# Patient Record
Sex: FEMALE | Race: WHITE | NOT HISPANIC OR LATINO | Employment: UNEMPLOYED | URBAN - METROPOLITAN AREA
[De-identification: names, ages, dates, MRNs, and addresses within clinical notes are randomized per-mention and may not be internally consistent; named-entity substitution may affect disease eponyms.]

---

## 2019-03-19 ENCOUNTER — HOSPITAL ENCOUNTER (OUTPATIENT)
Dept: URGENT CARE | Facility: CLINIC | Age: 24
Discharge: HOME | End: 2019-03-19

## 2019-03-21 LAB — BACTERIA UR CULT: NORMAL

## 2020-03-07 ENCOUNTER — HOSPITAL ENCOUNTER (OUTPATIENT)
Dept: URGENT CARE | Facility: CLINIC | Age: 25
Discharge: HOME | End: 2020-03-07

## 2020-03-08 LAB
C TRACH RRNA CVX QL NAA+PROBE: NOT DETECTED
N GONORRHOEA DNA SPEC QL NAA+PROBE: NOT DETECTED

## 2020-03-12 ENCOUNTER — HOSPITAL ENCOUNTER (OUTPATIENT)
Dept: URGENT CARE | Facility: CLINIC | Age: 25
Discharge: HOME | End: 2020-03-12
Attending: FAMILY MEDICINE

## 2020-03-14 LAB — BACTERIA SPEC AEROBE CULT: NORMAL

## 2020-06-29 ENCOUNTER — HOSPITAL ENCOUNTER (OUTPATIENT)
Dept: URGENT CARE | Facility: CLINIC | Age: 25
Discharge: HOME | End: 2020-06-29
Attending: PHYSICIAN ASSISTANT

## 2021-01-14 ENCOUNTER — HOSPITAL ENCOUNTER (OUTPATIENT)
Dept: URGENT CARE | Facility: CLINIC | Age: 26
Discharge: HOME | End: 2021-01-14
Attending: FAMILY MEDICINE

## 2021-01-16 LAB — SARS-COV-2 RNA SPEC QL NAA+PROBE: NOT DETECTED

## 2022-05-18 ENCOUNTER — HOSPITAL ENCOUNTER (EMERGENCY)
Facility: HOSPITAL | Age: 27
Discharge: HOME | End: 2022-05-19
Attending: EMERGENCY MEDICINE | Admitting: EMERGENCY MEDICINE

## 2022-05-18 DIAGNOSIS — F10.920 ALCOHOLIC INTOXICATION WITHOUT COMPLICATION: ICD-10-CM

## 2022-05-18 DIAGNOSIS — F32.A DEPRESSION, UNSPECIFIED DEPRESSION TYPE: Primary | ICD-10-CM

## 2022-05-18 LAB
ALBUMIN SERPL-MCNC: 5.3 G/DL (ref 3.5–5.2)
ALBUMIN/GLOB SERPL: 2.2 G/DL
ALP SERPL-CCNC: 99 U/L (ref 39–117)
ALT SERPL W P-5'-P-CCNC: 17 U/L (ref 1–33)
AMPHET+METHAMPHET UR QL: NEGATIVE
ANION GAP SERPL CALCULATED.3IONS-SCNC: 15.5 MMOL/L (ref 5–15)
APAP SERPL-MCNC: <5 MCG/ML (ref 0–30)
AST SERPL-CCNC: 19 U/L (ref 1–32)
BARBITURATES UR QL SCN: NEGATIVE
BASOPHILS # BLD AUTO: 0.06 10*3/MM3 (ref 0–0.2)
BASOPHILS NFR BLD AUTO: 0.9 % (ref 0–1.5)
BENZODIAZ UR QL SCN: NEGATIVE
BILIRUB SERPL-MCNC: 0.2 MG/DL (ref 0–1.2)
BUN SERPL-MCNC: 9 MG/DL (ref 6–20)
BUN/CREAT SERPL: 8.7 (ref 7–25)
CALCIUM SPEC-SCNC: 8.9 MG/DL (ref 8.6–10.5)
CANNABINOIDS SERPL QL: NEGATIVE
CHLORIDE SERPL-SCNC: 112 MMOL/L (ref 98–107)
CO2 SERPL-SCNC: 21.5 MMOL/L (ref 22–29)
COCAINE UR QL: NEGATIVE
CREAT SERPL-MCNC: 1.04 MG/DL (ref 0.57–1)
DEPRECATED RDW RBC AUTO: 42.8 FL (ref 37–54)
EGFRCR SERPLBLD CKD-EPI 2021: 76.2 ML/MIN/1.73
EOSINOPHIL # BLD AUTO: 0.18 10*3/MM3 (ref 0–0.4)
EOSINOPHIL NFR BLD AUTO: 2.6 % (ref 0.3–6.2)
ERYTHROCYTE [DISTWIDTH] IN BLOOD BY AUTOMATED COUNT: 13.1 % (ref 12.3–15.4)
ETHANOL BLD-MCNC: 297 MG/DL (ref 0–10)
ETHANOL UR QL: 0.3 %
GLOBULIN UR ELPH-MCNC: 2.4 GM/DL
GLUCOSE SERPL-MCNC: 112 MG/DL (ref 65–99)
HCG INTACT+B SERPL-ACNC: <0.5 MIU/ML
HCT VFR BLD AUTO: 40.4 % (ref 34–46.6)
HGB BLD-MCNC: 13.6 G/DL (ref 12–15.9)
HOLD SPECIMEN: NORMAL
HOLD SPECIMEN: NORMAL
IMM GRANULOCYTES # BLD AUTO: 0.01 10*3/MM3 (ref 0–0.05)
IMM GRANULOCYTES NFR BLD AUTO: 0.1 % (ref 0–0.5)
LYMPHOCYTES # BLD AUTO: 3.32 10*3/MM3 (ref 0.7–3.1)
LYMPHOCYTES NFR BLD AUTO: 48.8 % (ref 19.6–45.3)
MCH RBC QN AUTO: 30.3 PG (ref 26.6–33)
MCHC RBC AUTO-ENTMCNC: 33.7 G/DL (ref 31.5–35.7)
MCV RBC AUTO: 90 FL (ref 79–97)
METHADONE UR QL SCN: NEGATIVE
MONOCYTES # BLD AUTO: 0.42 10*3/MM3 (ref 0.1–0.9)
MONOCYTES NFR BLD AUTO: 6.2 % (ref 5–12)
NEUTROPHILS NFR BLD AUTO: 2.81 10*3/MM3 (ref 1.7–7)
NEUTROPHILS NFR BLD AUTO: 41.4 % (ref 42.7–76)
NRBC BLD AUTO-RTO: 0 /100 WBC (ref 0–0.2)
OPIATES UR QL: NEGATIVE
OXYCODONE UR QL SCN: NEGATIVE
PLATELET # BLD AUTO: 371 10*3/MM3 (ref 140–450)
PMV BLD AUTO: 8.2 FL (ref 6–12)
POTASSIUM SERPL-SCNC: 4.1 MMOL/L (ref 3.5–5.2)
PROT SERPL-MCNC: 7.7 G/DL (ref 6–8.5)
RBC # BLD AUTO: 4.49 10*6/MM3 (ref 3.77–5.28)
SALICYLATES SERPL-MCNC: <0.3 MG/DL
SARS-COV-2 RNA RESP QL NAA+PROBE: NOT DETECTED
SODIUM SERPL-SCNC: 149 MMOL/L (ref 136–145)
WBC NRBC COR # BLD: 6.8 10*3/MM3 (ref 3.4–10.8)
WHOLE BLOOD HOLD COAG: NORMAL
WHOLE BLOOD HOLD SPECIMEN: NORMAL

## 2022-05-18 PROCEDURE — 80307 DRUG TEST PRSMV CHEM ANLYZR: CPT | Performed by: NURSE PRACTITIONER

## 2022-05-18 PROCEDURE — 80053 COMPREHEN METABOLIC PANEL: CPT | Performed by: NURSE PRACTITIONER

## 2022-05-18 PROCEDURE — 99285 EMERGENCY DEPT VISIT HI MDM: CPT

## 2022-05-18 PROCEDURE — 99284 EMERGENCY DEPT VISIT MOD MDM: CPT

## 2022-05-18 PROCEDURE — 85025 COMPLETE CBC W/AUTO DIFF WBC: CPT | Performed by: NURSE PRACTITIONER

## 2022-05-18 PROCEDURE — U0003 INFECTIOUS AGENT DETECTION BY NUCLEIC ACID (DNA OR RNA); SEVERE ACUTE RESPIRATORY SYNDROME CORONAVIRUS 2 (SARS-COV-2) (CORONAVIRUS DISEASE [COVID-19]), AMPLIFIED PROBE TECHNIQUE, MAKING USE OF HIGH THROUGHPUT TECHNOLOGIES AS DESCRIBED BY CMS-2020-01-R: HCPCS | Performed by: EMERGENCY MEDICINE

## 2022-05-18 PROCEDURE — 80143 DRUG ASSAY ACETAMINOPHEN: CPT | Performed by: NURSE PRACTITIONER

## 2022-05-18 PROCEDURE — 84702 CHORIONIC GONADOTROPIN TEST: CPT | Performed by: NURSE PRACTITIONER

## 2022-05-18 PROCEDURE — 82077 ASSAY SPEC XCP UR&BREATH IA: CPT | Performed by: NURSE PRACTITIONER

## 2022-05-18 PROCEDURE — 80179 DRUG ASSAY SALICYLATE: CPT | Performed by: NURSE PRACTITIONER

## 2022-05-18 PROCEDURE — 96375 TX/PRO/DX INJ NEW DRUG ADDON: CPT

## 2022-05-18 PROCEDURE — 25010000002 LORAZEPAM PER 2 MG: Performed by: EMERGENCY MEDICINE

## 2022-05-18 RX ORDER — LORAZEPAM 2 MG/ML
1 INJECTION INTRAMUSCULAR ONCE
Status: COMPLETED | OUTPATIENT
Start: 2022-05-18 | End: 2022-05-18

## 2022-05-18 RX ORDER — OMEPRAZOLE 20 MG/1
20 CAPSULE, DELAYED RELEASE ORAL DAILY
COMMUNITY
End: 2022-08-28

## 2022-05-18 RX ORDER — DESVENLAFAXINE 100 MG/1
100 TABLET, EXTENDED RELEASE ORAL DAILY
COMMUNITY
Start: 2022-05-06 | End: 2022-08-28

## 2022-05-18 RX ORDER — SODIUM CHLORIDE 0.9 % (FLUSH) 0.9 %
10 SYRINGE (ML) INJECTION AS NEEDED
Status: DISCONTINUED | OUTPATIENT
Start: 2022-05-18 | End: 2022-05-19 | Stop reason: HOSPADM

## 2022-05-18 RX ORDER — HYDROXYZINE HYDROCHLORIDE 25 MG/1
25-50 TABLET, FILM COATED ORAL
COMMUNITY
Start: 2022-05-06 | End: 2022-06-05

## 2022-05-18 RX ADMIN — LORAZEPAM 1 MG: 2 INJECTION INTRAMUSCULAR; INTRAVENOUS at 22:46

## 2022-05-19 VITALS
BODY MASS INDEX: 19.72 KG/M2 | SYSTOLIC BLOOD PRESSURE: 90 MMHG | DIASTOLIC BLOOD PRESSURE: 61 MMHG | HEART RATE: 87 BPM | RESPIRATION RATE: 20 BRPM | WEIGHT: 115.52 LBS | HEIGHT: 64 IN | OXYGEN SATURATION: 96 % | TEMPERATURE: 98.3 F

## 2022-05-19 LAB
ETHANOL BLD-MCNC: 120 MG/DL (ref 0–10)
ETHANOL BLD-MCNC: 72 MG/DL (ref 0–10)
ETHANOL UR QL: 0.07 %
ETHANOL UR QL: 0.12 %

## 2022-05-19 PROCEDURE — 25010000002 DIPHENHYDRAMINE PER 50 MG: Performed by: NURSE PRACTITIONER

## 2022-05-19 PROCEDURE — 96375 TX/PRO/DX INJ NEW DRUG ADDON: CPT

## 2022-05-19 PROCEDURE — 96365 THER/PROPH/DIAG IV INF INIT: CPT

## 2022-05-19 PROCEDURE — 82077 ASSAY SPEC XCP UR&BREATH IA: CPT | Performed by: NURSE PRACTITIONER

## 2022-05-19 PROCEDURE — 25010000002 THIAMINE PER 100 MG: Performed by: NURSE PRACTITIONER

## 2022-05-19 PROCEDURE — 25010000002 HALOPERIDOL LACTATE PER 5 MG: Performed by: NURSE PRACTITIONER

## 2022-05-19 RX ORDER — DIPHENHYDRAMINE HYDROCHLORIDE 50 MG/ML
25 INJECTION INTRAMUSCULAR; INTRAVENOUS ONCE
Status: COMPLETED | OUTPATIENT
Start: 2022-05-19 | End: 2022-05-19

## 2022-05-19 RX ORDER — HALOPERIDOL 5 MG/ML
2 INJECTION INTRAMUSCULAR ONCE
Status: COMPLETED | OUTPATIENT
Start: 2022-05-19 | End: 2022-05-19

## 2022-05-19 RX ORDER — NICOTINE 21 MG/24HR
1 PATCH, TRANSDERMAL 24 HOURS TRANSDERMAL ONCE
Status: DISCONTINUED | OUTPATIENT
Start: 2022-05-19 | End: 2022-05-19 | Stop reason: HOSPADM

## 2022-05-19 RX ADMIN — HALOPERIDOL LACTATE 2 MG: 5 INJECTION, SOLUTION INTRAMUSCULAR at 00:14

## 2022-05-19 RX ADMIN — NICOTINE 1 PATCH: 21 PATCH, EXTENDED RELEASE TRANSDERMAL at 00:10

## 2022-05-19 RX ADMIN — DIPHENHYDRAMINE HYDROCHLORIDE 25 MG: 50 INJECTION INTRAMUSCULAR; INTRAVENOUS at 00:12

## 2022-05-19 RX ADMIN — THIAMINE HYDROCHLORIDE 1000 ML/HR: 100 INJECTION, SOLUTION INTRAMUSCULAR; INTRAVENOUS at 00:18

## 2022-05-19 NOTE — ED NOTES
"Pt to ED from home with reports of alcohol and drug abuse.  Pt states \"I am stressed, and I am fucked!\"  Pt tearful in triage, refusing to answer questions and is screaming at ED staff.    Pt's friend states pt has depression and anxiety and about a week ago pt had anxiety attack.      Friend states pt drinks when she drank a pint of fireball today.    "

## 2022-05-19 NOTE — DISCHARGE INSTRUCTIONS
Avoid drug and alcohol use.    Follow-up with your PCP or therapist of choice.    Stay with a responsible adult today until sober.    Return for new or worsening symptoms

## 2022-05-19 NOTE — ED PROVIDER NOTES
Time: 06:49 EDT  Arrived by: Private vehicle  Chief Complaint: Depression and suicidal ideation  History provided by: Patient and friend  History is limited by: Patient intoxication     History of Present Illness:  Patient is a 27 y.o. year old female that presents to the emergency department with depression and suicidal ideation today      History provided by:  Patient and friend  Psychiatric Evaluation  Location:  General  Quality:  NA  Severity:  Unable to specify  Onset quality:  Unable to specify  Timing:  Intermittent  Progression:  Unchanged  Chronicity:  Recurrent  Context:  Patient has a history of depression and anxiety.  Today was drinking fireball got upset and started making comments about dying and committing suicide  Relieved by:  Nothing  Worsened by:  Alcohol use  Ineffective treatments:  None tried  Associated symptoms: no abdominal pain, no chest pain, no congestion, no cough, no diarrhea, no ear pain, no fatigue, no fever, no headaches, no loss of consciousness, no myalgias, no nausea, no rash, no rhinorrhea, no shortness of breath, no sore throat, no vomiting and no wheezing          Similar Symptoms Previously: Unknown  Recently seen: PCP 2 weeks ago for anxiety and depression      Patient Care Team  Primary Care Provider: Javid Niño    Past Medical History:     Allergies   Allergen Reactions   • Latex Anaphylaxis     Past Medical History:   Diagnosis Date   • Anxiety    • Depression      Past Surgical History:   Procedure Laterality Date   •  SECTION  02/10/2015     Family History   Problem Relation Age of Onset   • Ovarian cancer Mother         30'S   • Skin cancer Other    • Hypertension Other        Home Medications:  Prior to Admission medications    Not on File        Social History:   PT  reports that she has quit smoking. She has a 1.50 pack-year smoking history. She does not have any smokeless tobacco history on file. No history on file for alcohol use and drug use.    Record  "Review:  I have reviewed the patient's records in [facility].     Review of Systems  Review of Systems   Constitutional: Negative for fatigue and fever.   HENT: Negative for congestion, ear pain, rhinorrhea and sore throat.    Respiratory: Negative for cough, shortness of breath and wheezing.    Cardiovascular: Negative for chest pain.   Gastrointestinal: Negative for abdominal pain, diarrhea, nausea and vomiting.   Genitourinary: Negative for flank pain.   Musculoskeletal: Negative for back pain and myalgias.   Skin: Negative for rash.   Neurological: Negative for loss of consciousness and headaches.   Hematological: Negative.    Psychiatric/Behavioral: Positive for dysphoric mood and suicidal ideas. The patient is nervous/anxious.         Physical Exam  BP 90/61 (BP Location: Right arm, Patient Position: Lying)   Pulse 87   Temp 98.3 °F (36.8 °C) (Oral)   Resp 20   Ht 162.6 cm (64\")   Wt 52.4 kg (115 lb 8.3 oz)   LMP  (LMP Unknown)   SpO2 96%   BMI 19.83 kg/m²     Physical Exam  Vitals and nursing note reviewed.   Constitutional:       General: She is not in acute distress.     Appearance: She is not ill-appearing or toxic-appearing.      Comments: Patient awake and alert but obviously intoxicated.  Patient tearful and crying during exam   HENT:      Head: Normocephalic and atraumatic.      Right Ear: Tympanic membrane, ear canal and external ear normal.      Left Ear: Tympanic membrane, ear canal and external ear normal.      Mouth/Throat:      Mouth: Mucous membranes are moist.   Eyes:      General: No scleral icterus.  Cardiovascular:      Rate and Rhythm: Regular rhythm. Tachycardia present.      Pulses: Normal pulses.      Heart sounds: Normal heart sounds.   Pulmonary:      Effort: Pulmonary effort is normal. No respiratory distress.      Breath sounds: Normal breath sounds.   Abdominal:      General: Bowel sounds are normal.      Palpations: Abdomen is soft.      Tenderness: There is no abdominal " "tenderness.   Musculoskeletal:         General: Normal range of motion.      Cervical back: Normal range of motion and neck supple.   Skin:     General: Skin is warm and dry.   Neurological:      Mental Status: She is alert and oriented to person, place, and time.   Psychiatric:      Comments: Patient crying.  Patient reports being suicidal but despite no plan states \"I am sure I can figure something out\"  When asked why patient is feeling this way she refuses to answer questions and states she does not want to talk about it          ED Course  BP 90/61 (BP Location: Right arm, Patient Position: Lying)   Pulse 87   Temp 98.3 °F (36.8 °C) (Oral)   Resp 20   Ht 162.6 cm (64\")   Wt 52.4 kg (115 lb 8.3 oz)   LMP  (LMP Unknown)   SpO2 96%   BMI 19.83 kg/m²   Results for orders placed or performed during the hospital encounter of 05/18/22   COVID-19,CEPHEID/ARA,COR/YUSUF/PAD/RICO IN-HOUSE(OR EMERGENT/ADD-ON),NP SWAB IN TRANSPORT MEDIA 3-4 HR TAT, RT-PCR - Swab, Nasopharynx    Specimen: Nasopharynx; Swab   Result Value Ref Range    COVID19 Not Detected Not Detected - Ref. Range   Comprehensive Metabolic Panel    Specimen: Blood   Result Value Ref Range    Glucose 112 (H) 65 - 99 mg/dL    BUN 9 6 - 20 mg/dL    Creatinine 1.04 (H) 0.57 - 1.00 mg/dL    Sodium 149 (H) 136 - 145 mmol/L    Potassium 4.1 3.5 - 5.2 mmol/L    Chloride 112 (H) 98 - 107 mmol/L    CO2 21.5 (L) 22.0 - 29.0 mmol/L    Calcium 8.9 8.6 - 10.5 mg/dL    Total Protein 7.7 6.0 - 8.5 g/dL    Albumin 5.30 (H) 3.50 - 5.20 g/dL    ALT (SGPT) 17 1 - 33 U/L    AST (SGOT) 19 1 - 32 U/L    Alkaline Phosphatase 99 39 - 117 U/L    Total Bilirubin 0.2 0.0 - 1.2 mg/dL    Globulin 2.4 gm/dL    A/G Ratio 2.2 g/dL    BUN/Creatinine Ratio 8.7 7.0 - 25.0    Anion Gap 15.5 (H) 5.0 - 15.0 mmol/L    eGFR 76.2 >60.0 mL/min/1.73   Acetaminophen Level    Specimen: Blood   Result Value Ref Range    Acetaminophen <5.0 0.0 - 30.0 mcg/mL   Ethanol    Specimen: Blood   Result Value " Ref Range    Ethanol 297 (H) 0 - 10 mg/dL    Ethanol % 0.297 %   Salicylate Level    Specimen: Blood   Result Value Ref Range    Salicylate <0.3 <=30.0 mg/dL   Urine Drug Screen - Urine, Clean Catch    Specimen: Urine, Clean Catch   Result Value Ref Range    Amphet/Methamphet, Screen Negative Negative    Barbiturates Screen, Urine Negative Negative    Benzodiazepine Screen, Urine Negative Negative    Cocaine Screen, Urine Negative Negative    Opiate Screen Negative Negative    THC, Screen, Urine Negative Negative    Methadone Screen, Urine Negative Negative    Oxycodone Screen, Urine Negative Negative   CBC Auto Differential    Specimen: Blood   Result Value Ref Range    WBC 6.80 3.40 - 10.80 10*3/mm3    RBC 4.49 3.77 - 5.28 10*6/mm3    Hemoglobin 13.6 12.0 - 15.9 g/dL    Hematocrit 40.4 34.0 - 46.6 %    MCV 90.0 79.0 - 97.0 fL    MCH 30.3 26.6 - 33.0 pg    MCHC 33.7 31.5 - 35.7 g/dL    RDW 13.1 12.3 - 15.4 %    RDW-SD 42.8 37.0 - 54.0 fl    MPV 8.2 6.0 - 12.0 fL    Platelets 371 140 - 450 10*3/mm3    Neutrophil % 41.4 (L) 42.7 - 76.0 %    Lymphocyte % 48.8 (H) 19.6 - 45.3 %    Monocyte % 6.2 5.0 - 12.0 %    Eosinophil % 2.6 0.3 - 6.2 %    Basophil % 0.9 0.0 - 1.5 %    Immature Grans % 0.1 0.0 - 0.5 %    Neutrophils, Absolute 2.81 1.70 - 7.00 10*3/mm3    Lymphocytes, Absolute 3.32 (H) 0.70 - 3.10 10*3/mm3    Monocytes, Absolute 0.42 0.10 - 0.90 10*3/mm3    Eosinophils, Absolute 0.18 0.00 - 0.40 10*3/mm3    Basophils, Absolute 0.06 0.00 - 0.20 10*3/mm3    Immature Grans, Absolute 0.01 0.00 - 0.05 10*3/mm3    nRBC 0.0 0.0 - 0.2 /100 WBC   hCG, Quantitative, Pregnancy    Specimen: Blood   Result Value Ref Range    HCG Quantitative <0.50 mIU/mL   Ethanol    Specimen: Blood   Result Value Ref Range    Ethanol 120 (H) 0 - 10 mg/dL    Ethanol % 0.120 %   Ethanol    Specimen: Blood   Result Value Ref Range    Ethanol 72 (H) 0 - 10 mg/dL    Ethanol % 0.072 %   Green Top (Gel)   Result Value Ref Range    Extra Tube Hold for  add-ons.    Lavender Top   Result Value Ref Range    Extra Tube hold for add-on    Gold Top - SST   Result Value Ref Range    Extra Tube Hold for add-ons.    Light Blue Top   Result Value Ref Range    Extra Tube Hold for add-ons.      Medications   sodium chloride 0.9 % flush 10 mL (has no administration in time range)   nicotine (NICODERM CQ) 21 MG/24HR patch 1 patch (1 patch Transdermal Medication Applied 5/19/22 0010)   LORazepam (ATIVAN) injection 1 mg (1 mg Intravenous Given 5/18/22 2246)   thiamine (B-1) 100 mg, folic acid 1 mg in sodium chloride 0.9 % 1,000 mL infusion (0 mL/hr Intravenous Stopped 5/19/22 0318)   haloperidol lactate (HALDOL) injection 2 mg (2 mg Intravenous Given 5/19/22 0014)   diphenhydrAMINE (BENADRYL) injection 25 mg (25 mg Intravenous Given 5/19/22 0012)     No results found.      Medical Decision Making:                     MDM  Number of Diagnoses or Management Options  Alcoholic intoxication without complication (HCC)  Depression, unspecified depression type  Diagnosis management comments: I have spoken with the patient. I have explained the patient´s condition, diagnoses and treatment plan based on the information available to me at this time. I have answered the patient's questions and addressed any concerns. The patient has a good  understanding of the patient´s diagnosis, condition, and treatment plan as can be expected at this point. The vital signs have been stable. The patient´s condition is stable and appropriate for discharge from the emergency department.      The patient will pursue further outpatient evaluation with the primary care physician or other designated or consulting physician as outlined in the discharge instructions. They are agreeable to this plan of care and follow-up instructions have been explained in detail. The patient has received these instructions in written format and have expressed an understanding of the discharge instructions. The patient is aware  that any significant change in condition or worsening of symptoms should prompt an immediate return to this or the closest emergency department or call to 911.         Amount and/or Complexity of Data Reviewed  Clinical lab tests: reviewed and ordered  Obtain history from someone other than the patient: yes (Friend at bedside)    Risk of Complications, Morbidity, and/or Mortality  Presenting problems: low  Diagnostic procedures: low  Management options: low    Patient Progress  Patient progress: stable       Final diagnoses:   Depression, unspecified depression type   Alcoholic intoxication without complication (HCC)        Disposition:  ED Disposition     ED Disposition   Discharge    Condition   Stable    Comment   --              Yaquelin Sims, APRN  05/19/22 0649     Lab Facility: [id number] Lab Facility: [id number]

## 2023-12-14 ENCOUNTER — HOSPITAL ENCOUNTER (INPATIENT)
Facility: HOSPITAL | Age: 28
LOS: 2 days | Discharge: PSYCHIATRIC OR SUBSTANCE USE FACILITY | DRG: 918 | End: 2023-12-16
Attending: EMERGENCY MEDICINE | Admitting: FAMILY MEDICINE
Payer: COMMERCIAL

## 2023-12-14 DIAGNOSIS — T45.0X2A INTENTIONAL DIPHENHYDRAMINE OVERDOSE, INITIAL ENCOUNTER: Primary | ICD-10-CM

## 2023-12-14 DIAGNOSIS — F10.920 ALCOHOLIC INTOXICATION WITHOUT COMPLICATION: ICD-10-CM

## 2023-12-14 PROBLEM — T50.902A INTENTIONAL OVERDOSE: Status: ACTIVE | Noted: 2023-12-14

## 2023-12-14 LAB
ALBUMIN SERPL-MCNC: 4.5 G/DL (ref 3.5–5.2)
ALBUMIN/GLOB SERPL: 1.7 G/DL
ALP SERPL-CCNC: 114 U/L (ref 39–117)
ALT SERPL W P-5'-P-CCNC: 18 U/L (ref 1–33)
AMPHET+METHAMPHET UR QL: NEGATIVE
ANION GAP SERPL CALCULATED.3IONS-SCNC: 14.2 MMOL/L (ref 5–15)
APAP SERPL-MCNC: <5 MCG/ML (ref 0–30)
AST SERPL-CCNC: 21 U/L (ref 1–32)
BARBITURATES UR QL SCN: NEGATIVE
BASOPHILS # BLD AUTO: 0.04 10*3/MM3 (ref 0–0.2)
BASOPHILS NFR BLD AUTO: 0.5 % (ref 0–1.5)
BENZODIAZ UR QL SCN: POSITIVE
BILIRUB SERPL-MCNC: 0.2 MG/DL (ref 0–1.2)
BUN SERPL-MCNC: 4 MG/DL (ref 6–20)
BUN/CREAT SERPL: 4.5 (ref 7–25)
CALCIUM SPEC-SCNC: 8.7 MG/DL (ref 8.6–10.5)
CANNABINOIDS SERPL QL: POSITIVE
CHLORIDE SERPL-SCNC: 107 MMOL/L (ref 98–107)
CK SERPL-CCNC: 59 U/L (ref 20–180)
CO2 SERPL-SCNC: 23.8 MMOL/L (ref 22–29)
COCAINE UR QL: NEGATIVE
CREAT SERPL-MCNC: 0.88 MG/DL (ref 0.57–1)
DEPRECATED RDW RBC AUTO: 42.5 FL (ref 37–54)
EGFRCR SERPLBLD CKD-EPI 2021: 92.5 ML/MIN/1.73
EOSINOPHIL # BLD AUTO: 0.07 10*3/MM3 (ref 0–0.4)
EOSINOPHIL NFR BLD AUTO: 0.9 % (ref 0.3–6.2)
ERYTHROCYTE [DISTWIDTH] IN BLOOD BY AUTOMATED COUNT: 12.9 % (ref 12.3–15.4)
ETHANOL BLD-MCNC: 141 MG/DL (ref 0–10)
ETHANOL UR QL: 0.14 %
FENTANYL UR-MCNC: NEGATIVE NG/ML
GLOBULIN UR ELPH-MCNC: 2.7 GM/DL
GLUCOSE SERPL-MCNC: 89 MG/DL (ref 65–99)
HCT VFR BLD AUTO: 42.1 % (ref 34–46.6)
HGB BLD-MCNC: 13.8 G/DL (ref 12–15.9)
HOLD SPECIMEN: NORMAL
HOLD SPECIMEN: NORMAL
IMM GRANULOCYTES # BLD AUTO: 0.02 10*3/MM3 (ref 0–0.05)
IMM GRANULOCYTES NFR BLD AUTO: 0.3 % (ref 0–0.5)
LYMPHOCYTES # BLD AUTO: 2.31 10*3/MM3 (ref 0.7–3.1)
LYMPHOCYTES NFR BLD AUTO: 30.6 % (ref 19.6–45.3)
MCH RBC QN AUTO: 29.8 PG (ref 26.6–33)
MCHC RBC AUTO-ENTMCNC: 32.8 G/DL (ref 31.5–35.7)
MCV RBC AUTO: 90.9 FL (ref 79–97)
METHADONE UR QL SCN: NEGATIVE
MONOCYTES # BLD AUTO: 0.29 10*3/MM3 (ref 0.1–0.9)
MONOCYTES NFR BLD AUTO: 3.8 % (ref 5–12)
NEUTROPHILS NFR BLD AUTO: 4.83 10*3/MM3 (ref 1.7–7)
NEUTROPHILS NFR BLD AUTO: 63.9 % (ref 42.7–76)
NRBC BLD AUTO-RTO: 0 /100 WBC (ref 0–0.2)
OPIATES UR QL: NEGATIVE
OXYCODONE UR QL SCN: NEGATIVE
PLATELET # BLD AUTO: 438 10*3/MM3 (ref 140–450)
PMV BLD AUTO: 8.6 FL (ref 6–12)
POTASSIUM SERPL-SCNC: 3.6 MMOL/L (ref 3.5–5.2)
PROT SERPL-MCNC: 7.2 G/DL (ref 6–8.5)
RBC # BLD AUTO: 4.63 10*6/MM3 (ref 3.77–5.28)
SALICYLATES SERPL-MCNC: <0.3 MG/DL
SALICYLATES SERPL-MCNC: <0.3 MG/DL
SODIUM SERPL-SCNC: 145 MMOL/L (ref 136–145)
WBC NRBC COR # BLD AUTO: 7.56 10*3/MM3 (ref 3.4–10.8)
WHOLE BLOOD HOLD COAG: NORMAL
WHOLE BLOOD HOLD SPECIMEN: NORMAL

## 2023-12-14 PROCEDURE — 99285 EMERGENCY DEPT VISIT HI MDM: CPT

## 2023-12-14 PROCEDURE — 80307 DRUG TEST PRSMV CHEM ANLYZR: CPT | Performed by: EMERGENCY MEDICINE

## 2023-12-14 PROCEDURE — 80179 DRUG ASSAY SALICYLATE: CPT

## 2023-12-14 PROCEDURE — 93010 ELECTROCARDIOGRAM REPORT: CPT | Performed by: INTERNAL MEDICINE

## 2023-12-14 PROCEDURE — 82550 ASSAY OF CK (CPK): CPT

## 2023-12-14 PROCEDURE — 93005 ELECTROCARDIOGRAM TRACING: CPT

## 2023-12-14 PROCEDURE — 25010000002 ONDANSETRON PER 1 MG: Performed by: EMERGENCY MEDICINE

## 2023-12-14 PROCEDURE — 80143 DRUG ASSAY ACETAMINOPHEN: CPT

## 2023-12-14 PROCEDURE — 85025 COMPLETE CBC W/AUTO DIFF WBC: CPT

## 2023-12-14 PROCEDURE — 93005 ELECTROCARDIOGRAM TRACING: CPT | Performed by: EMERGENCY MEDICINE

## 2023-12-14 PROCEDURE — 80053 COMPREHEN METABOLIC PANEL: CPT

## 2023-12-14 PROCEDURE — 36415 COLL VENOUS BLD VENIPUNCTURE: CPT

## 2023-12-14 PROCEDURE — 25810000003 SODIUM CHLORIDE 0.9 % SOLUTION: Performed by: EMERGENCY MEDICINE

## 2023-12-14 PROCEDURE — 82077 ASSAY SPEC XCP UR&BREATH IA: CPT

## 2023-12-14 PROCEDURE — 80179 DRUG ASSAY SALICYLATE: CPT | Performed by: FAMILY MEDICINE

## 2023-12-14 PROCEDURE — 25010000002 LORAZEPAM PER 2 MG: Performed by: EMERGENCY MEDICINE

## 2023-12-14 PROCEDURE — 99222 1ST HOSP IP/OBS MODERATE 55: CPT | Performed by: FAMILY MEDICINE

## 2023-12-14 RX ORDER — BISACODYL 5 MG/1
5 TABLET, DELAYED RELEASE ORAL DAILY PRN
Status: DISCONTINUED | OUTPATIENT
Start: 2023-12-14 | End: 2023-12-15

## 2023-12-14 RX ORDER — SODIUM CHLORIDE 9 MG/ML
40 INJECTION, SOLUTION INTRAVENOUS AS NEEDED
Status: DISCONTINUED | OUTPATIENT
Start: 2023-12-14 | End: 2023-12-16 | Stop reason: HOSPADM

## 2023-12-14 RX ORDER — LORAZEPAM 2 MG/ML
1 INJECTION INTRAMUSCULAR ONCE
Status: COMPLETED | OUTPATIENT
Start: 2023-12-14 | End: 2023-12-14

## 2023-12-14 RX ORDER — BISACODYL 10 MG
10 SUPPOSITORY, RECTAL RECTAL DAILY PRN
Status: DISCONTINUED | OUTPATIENT
Start: 2023-12-14 | End: 2023-12-15

## 2023-12-14 RX ORDER — SODIUM CHLORIDE 0.9 % (FLUSH) 0.9 %
10 SYRINGE (ML) INJECTION AS NEEDED
Status: DISCONTINUED | OUTPATIENT
Start: 2023-12-14 | End: 2023-12-16 | Stop reason: HOSPADM

## 2023-12-14 RX ORDER — ONDANSETRON 2 MG/ML
4 INJECTION INTRAMUSCULAR; INTRAVENOUS ONCE
Status: COMPLETED | OUTPATIENT
Start: 2023-12-14 | End: 2023-12-14

## 2023-12-14 RX ORDER — SODIUM CHLORIDE 0.9 % (FLUSH) 0.9 %
10 SYRINGE (ML) INJECTION EVERY 12 HOURS SCHEDULED
Status: DISCONTINUED | OUTPATIENT
Start: 2023-12-14 | End: 2023-12-16 | Stop reason: HOSPADM

## 2023-12-14 RX ORDER — HEPARIN SODIUM 5000 [USP'U]/ML
5000 INJECTION, SOLUTION INTRAVENOUS; SUBCUTANEOUS EVERY 8 HOURS SCHEDULED
Status: DISCONTINUED | OUTPATIENT
Start: 2023-12-14 | End: 2023-12-16 | Stop reason: HOSPADM

## 2023-12-14 RX ORDER — ALPRAZOLAM 1 MG/1
1 TABLET ORAL 2 TIMES DAILY PRN
Status: DISCONTINUED | OUTPATIENT
Start: 2023-12-14 | End: 2023-12-15

## 2023-12-14 RX ORDER — POLYETHYLENE GLYCOL 3350 17 G/17G
17 POWDER, FOR SOLUTION ORAL DAILY PRN
Status: DISCONTINUED | OUTPATIENT
Start: 2023-12-14 | End: 2023-12-15

## 2023-12-14 RX ORDER — FAMOTIDINE 10 MG/ML
20 INJECTION, SOLUTION INTRAVENOUS ONCE
Status: COMPLETED | OUTPATIENT
Start: 2023-12-14 | End: 2023-12-14

## 2023-12-14 RX ORDER — AMOXICILLIN 250 MG
2 CAPSULE ORAL 2 TIMES DAILY
Status: DISCONTINUED | OUTPATIENT
Start: 2023-12-14 | End: 2023-12-15

## 2023-12-14 RX ORDER — NITROGLYCERIN 0.4 MG/1
0.4 TABLET SUBLINGUAL
Status: DISCONTINUED | OUTPATIENT
Start: 2023-12-14 | End: 2023-12-15

## 2023-12-14 RX ADMIN — ONDANSETRON 4 MG: 2 INJECTION INTRAMUSCULAR; INTRAVENOUS at 20:11

## 2023-12-14 RX ADMIN — FAMOTIDINE 20 MG: 10 INJECTION INTRAVENOUS at 20:13

## 2023-12-14 RX ADMIN — SODIUM CHLORIDE 1000 ML: 9 INJECTION, SOLUTION INTRAVENOUS at 19:00

## 2023-12-14 RX ADMIN — LORAZEPAM 1 MG: 2 INJECTION INTRAMUSCULAR; INTRAVENOUS at 19:00

## 2023-12-14 NOTE — ED PROVIDER NOTES
Time: 6:16 PM EST  Date of encounter:  2023  Independent Historian/Clinical History and Information was obtained by:   Patient and Nursing Staff    History is limited by: N/A    Chief Complaint: Intentional overdose      History of Present Illness:  Patient is a 28 y.o. year old female who presents to the emergency department for evaluation of intentional overdose/suicide attempt.  Patient states she took 30-40 diphenhydramine tablets in the past 4 to 5 hours.  This was an attempt to kill herself.  She also has had alcohol today.  She denies any other coingestants.    HPI    Patient Care Team  Primary Care Provider: Javid Niño NP    Past Medical History:     Allergies   Allergen Reactions    Latex Anaphylaxis     Past Medical History:   Diagnosis Date    Anxiety     Depression      Past Surgical History:   Procedure Laterality Date     SECTION  02/10/2015     Family History   Problem Relation Age of Onset    Hypertension Mother     Ovarian cancer Mother         30'S    Hypertension Father     Skin cancer Other     Hypertension Other        Home Medications:  Prior to Admission medications    Medication Sig Start Date End Date Taking? Authorizing Provider   ALPRAZolam (XANAX) 1 MG tablet Take 1 tablet by mouth.    ProviderMagdalena MD   ARIPiprazole (Abilify) 15 MG tablet Take 15 mg by mouth Daily.    Emergency, Nurse Angelica RN   benztropine (COGENTIN) 1 MG tablet Take 1 tablet by mouth.    ProviderMagdalena MD   desvenlafaxine (PRISTIQ) 50 MG 24 hr tablet Take 1 tablet by mouth Every Morning. 10/27/23   ProviderMagdalena MD   doxepin (SINEquan) 50 MG capsule Take 1 capsule by mouth.    ProviderMagdalena MD   gabapentin (NEURONTIN) 600 MG tablet Take 600 mg by mouth 3 (Three) Times a Day.    Emergency, Nurse Angelica RN   lamoTRIgine (LaMICtal) 200 MG tablet Take 1 tablet by mouth Daily.    Magdalena Can MD   Levonorgestrel (Mirena, 52 MG,) 20 MCG/DAY intrauterine device IUD 1  "each by Intrauterine route.    Emergency, Nurse Angelica, RN        Social History:   Social History     Tobacco Use    Smoking status: Former     Packs/day: 0.50     Years: 3.00     Additional pack years: 0.00     Total pack years: 1.50     Types: Cigarettes    Smokeless tobacco: Never   Vaping Use    Vaping Use: Never used   Substance Use Topics    Alcohol use: Yes     Comment: occasional    Drug use: Defer         Review of Systems:  Review of Systems   Constitutional:  Negative for chills and fever.   HENT:  Negative for congestion, rhinorrhea and sore throat.    Eyes:  Negative for photophobia.   Respiratory:  Negative for apnea, cough, chest tightness and shortness of breath.    Cardiovascular:  Negative for chest pain and palpitations.   Gastrointestinal:  Positive for nausea. Negative for abdominal pain, diarrhea and vomiting.   Endocrine: Negative.    Genitourinary:  Negative for difficulty urinating and dysuria.   Musculoskeletal:  Negative for back pain, joint swelling and myalgias.   Skin:  Negative for color change and wound.   Allergic/Immunologic: Negative.    Neurological:  Positive for tremors. Negative for seizures and headaches.   Psychiatric/Behavioral: Negative.  The patient is nervous/anxious.    All other systems reviewed and are negative.       Physical Exam:  /93   Pulse 98   Temp 97.7 °F (36.5 °C) (Oral)   Resp 18   Ht 162.6 cm (64\")   Wt 76.8 kg (169 lb 5 oz)   LMP  (LMP Unknown) Comment: does not have periods with IUD, hasn't had one in 5 years  SpO2 99%   BMI 29.06 kg/m²     Physical Exam  Vitals and nursing note reviewed.   Constitutional:       General: She is awake.      Appearance: Normal appearance.   HENT:      Head: Normocephalic and atraumatic.      Nose: Nose normal.      Mouth/Throat:      Mouth: Mucous membranes are moist.   Eyes:      Extraocular Movements: Extraocular movements intact.      Pupils: Pupils are equal, round, and reactive to light.      Comments: " Dilated pupils   Cardiovascular:      Rate and Rhythm: Normal rate and regular rhythm.      Heart sounds: Normal heart sounds.   Pulmonary:      Effort: Pulmonary effort is normal. No respiratory distress.      Breath sounds: Normal breath sounds. No wheezing, rhonchi or rales.   Abdominal:      General: Bowel sounds are normal.      Palpations: Abdomen is soft.      Tenderness: There is no abdominal tenderness. There is no guarding or rebound.      Comments: No rigidity   Musculoskeletal:         General: No tenderness. Normal range of motion.      Cervical back: Normal range of motion and neck supple.   Skin:     General: Skin is warm and dry.      Coloration: Skin is not jaundiced.   Neurological:      General: No focal deficit present.      Mental Status: She is alert.      Comments: Fully alert   Psychiatric:      Comments: Tremulous, mildly confused, anxious                  Procedures:  Procedures      Medical Decision Making:      Comorbidities that affect care:    Depression/anxiety    External Notes reviewed:    Previous Clinic Note: Family medicine outpatient visit 10/24/2023 for tinea corporis      The following orders were placed and all results were independently analyzed by me:  Orders Placed This Encounter   Procedures    [place] Draw    Comprehensive Metabolic Panel    Acetaminophen Level    Ethanol    Salicylate Level    Urine Drug Screen - Urine, Clean Catch    CBC Auto Differential    CK    NPO Diet NPO Type: Strict NPO    Continuous Pulse Oximetry    Vital Signs    Undress & Gown    Psych / Access to See    Hospitalist (on-call MD unless specified)    Oxygen Therapy- Nasal Cannula; Titrate 1-6 LPM Per SpO2; 90 - 95%    POC Glucose Once    ECG 12 Lead Drug Monitoring    Insert Peripheral IV    Legal Status 72 Hour Hold    Suicide Precautions    CBC & Differential    Green Top (Gel)    Lavender Top    Gold Top - SST    Light Blue Top       Medications Given in the Emergency  Department:  Medications   sodium chloride 0.9 % flush 10 mL (has no administration in time range)   famotidine (PEPCID) injection 20 mg (has no administration in time range)   ondansetron (ZOFRAN) injection 4 mg (has no administration in time range)   LORazepam (ATIVAN) injection 1 mg (1 mg Intravenous Given 12/14/23 1900)   sodium chloride 0.9 % bolus 1,000 mL (1,000 mL Intravenous New Bag 12/14/23 1900)        ED Course:    ED Course as of 12/14/23 2007   Thu Dec 14, 2023   1850 I have personally interpreted the EKG today and it shows no evidence of any acute ischemia or heart arrhythmia.  Sinus rhythm, heart rate 98 [RP]      ED Course User Index  [RP] Jak Harrell MD       Labs:    Lab Results (last 24 hours)       Procedure Component Value Units Date/Time    CBC & Differential [[id number]]  (Abnormal) Collected: 12/14/23 1645    Specimen: Blood Updated: 12/14/23 1652    Narrative:      The following orders were created for panel order CBC & Differential.  Procedure                               Abnormality         Status                     ---------                               -----------         ------                     CBC Auto Differential[[id number]]        Abnormal            Final result                 Please view results for these tests on the individual orders.    Comprehensive Metabolic Panel [[id number]]  (Abnormal) Collected: 12/14/23 1645    Specimen: Blood Updated: 12/14/23 1739     Glucose 89 mg/dL      BUN 4 mg/dL      Creatinine 0.88 mg/dL      Sodium 145 mmol/L      Potassium 3.6 mmol/L      Comment: Slight hemolysis detected by analyzer. Result may be falsely elevated.        Chloride 107 mmol/L      CO2 23.8 mmol/L      Calcium 8.7 mg/dL      Total Protein 7.2 g/dL      Albumin 4.5 g/dL      ALT (SGPT) 18 U/L      AST (SGOT) 21 U/L      Comment: Slight hemolysis detected by analyzer. Result may be falsely elevated.        Alkaline Phosphatase 114 U/L      Total Bilirubin 0.2 mg/dL       Globulin 2.7 gm/dL      A/G Ratio 1.7 g/dL      BUN/Creatinine Ratio 4.5     Anion Gap 14.2 mmol/L      eGFR 92.5 mL/min/1.73     Narrative:      GFR Normal >60  Chronic Kidney Disease <60  Kidney Failure <15      Acetaminophen Level [[id number]]  (Normal) Collected: 12/14/23 1645    Specimen: Blood Updated: 12/14/23 1717     Acetaminophen <5.0 mcg/mL     Ethanol [[id number]]  (Abnormal) Collected: 12/14/23 1645    Specimen: Blood Updated: 12/14/23 1717     Ethanol 141 mg/dL      Ethanol % 0.141 %     Narrative:      Ethanol (Plasma)  <10 Essentially Negative    Toxic Concentrations           mg/dL    Flushing, slowing of reflexes    Impaired visual activity         Depression of CNS              >100  Possible Coma                  >300       Salicylate Level [[id number]]  (Normal) Collected: 12/14/23 1645    Specimen: Blood Updated: 12/14/23 1717     Salicylate <0.3 mg/dL     CBC Auto Differential [[id number]]  (Abnormal) Collected: 12/14/23 1645    Specimen: Blood Updated: 12/14/23 1652     WBC 7.56 10*3/mm3      RBC 4.63 10*6/mm3      Hemoglobin 13.8 g/dL      Hematocrit 42.1 %      MCV 90.9 fL      MCH 29.8 pg      MCHC 32.8 g/dL      RDW 12.9 %      RDW-SD 42.5 fl      MPV 8.6 fL      Platelets 438 10*3/mm3      Neutrophil % 63.9 %      Lymphocyte % 30.6 %      Monocyte % 3.8 %      Eosinophil % 0.9 %      Basophil % 0.5 %      Immature Grans % 0.3 %      Neutrophils, Absolute 4.83 10*3/mm3      Lymphocytes, Absolute 2.31 10*3/mm3      Monocytes, Absolute 0.29 10*3/mm3      Eosinophils, Absolute 0.07 10*3/mm3      Basophils, Absolute 0.04 10*3/mm3      Immature Grans, Absolute 0.02 10*3/mm3      nRBC 0.0 /100 WBC     CK [[id number]]  (Normal) Collected: 12/14/23 1645    Specimen: Blood Updated: 12/14/23 1717     Creatine Kinase 59 U/L     Urine Drug Screen - Urine, Clean Catch [[id number]]  (Abnormal) Collected: 12/14/23 1900    Specimen: Urine, Clean Catch Updated: 12/14/23 1942      Amphet/Methamphet, Screen Negative     Barbiturates Screen, Urine Negative     Benzodiazepine Screen, Urine Positive     Cocaine Screen, Urine Negative     Opiate Screen Negative     THC, Screen, Urine Positive     Methadone Screen, Urine Negative     Oxycodone Screen, Urine Negative     Fentanyl, Urine Negative    Narrative:      Negative Thresholds Per Drugs Screened:    Amphetamines                 500 ng/ml  Barbiturates                 200 ng/ml  Benzodiazepines              100 ng/ml  Cocaine                      300 ng/ml  Methadone                    300 ng/ml  Opiates                      300 ng/ml  Oxycodone                    100 ng/ml  THC                           50 ng/ml  Fentanyl                       5 ng/ml      The Normal Value for all drugs tested is negative. This report includes final unconfirmed screening results to be used for medical treatment purposes only. Unconfirmed results must not be used for non-medical purposes such as employment or legal testing. Clinical consideration should be applied to any drug of abuse test, particularly when unconfirmed results are used.                     Imaging:    No Radiology Exams Resulted Within Past 24 Hours      Differential Diagnosis and Discussion:    Psychiatric: Differential diagnosis includes but is not limited to depression, psychosis, bipolar disorder, anxiety, manic episode, schizophrenia, and substance abuse.    All labs were reviewed and interpreted by me.  EKG was interpreted by me.    [facility]               Patient Care Considerations:    CT HEAD: I considered ordering a noncontrast CT of the head, however patient did not have any head injury.      Consultants/Shared Management Plan:    Hospitalist: I have discussed the case with Dr. Purcell who agrees to accept the patient for admission.    Social Determinants of Health:    Patient is independent, reliable, and has access to care.       Disposition and Care Coordination:    Admit:   Through  independent evaluation of the patient's history, physical, and imperical data, the patient meets criteria for observation/admission to the hospital.        Final diagnoses:   Intentional diphenhydramine overdose, initial encounter   Alcoholic intoxication without complication        ED Disposition       ED Disposition   Decision to Admit    Condition   --    Comment   --               This medical record created using voice recognition software.             Jak Harrell MD  12/14/23 2008

## 2023-12-14 NOTE — ED NOTES
RN contacted Poison Control per OD protocol. PC advised to add a CK to existing lab work, to do an EKG and to watch for a widening QRS, and to monitor kidney function and urine output. PC also advised to watch for possible delirium and seizures and noted to use benzos as first line tx should a seizure occur. PC requested current vitals and asked to be updated should pt's condition change.

## 2023-12-14 NOTE — LETTER
[facility]  [address]  [place] [address]  [phone]        December 15, 2023      Patient: Romy Alonso  YOB: 1995  Date of Visit: 12/14/2023    Referral-    Intentional OD    Thank you,  Melita Basilio, MSW  [phone]

## 2023-12-15 LAB
ANION GAP SERPL CALCULATED.3IONS-SCNC: 14.2 MMOL/L (ref 5–15)
APAP SERPL-MCNC: <5 MCG/ML (ref 0–30)
BASOPHILS # BLD AUTO: 0.06 10*3/MM3 (ref 0–0.2)
BASOPHILS NFR BLD AUTO: 0.3 % (ref 0–1.5)
BUN SERPL-MCNC: 6 MG/DL (ref 6–20)
BUN/CREAT SERPL: 7.3 (ref 7–25)
CALCIUM SPEC-SCNC: 9.2 MG/DL (ref 8.6–10.5)
CHLORIDE SERPL-SCNC: 103 MMOL/L (ref 98–107)
CO2 SERPL-SCNC: 21.8 MMOL/L (ref 22–29)
CREAT SERPL-MCNC: 0.82 MG/DL (ref 0.57–1)
DEPRECATED RDW RBC AUTO: 43.1 FL (ref 37–54)
EGFRCR SERPLBLD CKD-EPI 2021: 100.7 ML/MIN/1.73
EOSINOPHIL # BLD AUTO: 0.02 10*3/MM3 (ref 0–0.4)
EOSINOPHIL NFR BLD AUTO: 0.1 % (ref 0.3–6.2)
ERYTHROCYTE [DISTWIDTH] IN BLOOD BY AUTOMATED COUNT: 13 % (ref 12.3–15.4)
GLUCOSE BLDC GLUCOMTR-MCNC: 95 MG/DL (ref 70–99)
GLUCOSE SERPL-MCNC: 96 MG/DL (ref 65–99)
HCT VFR BLD AUTO: 39.5 % (ref 34–46.6)
HGB BLD-MCNC: 13 G/DL (ref 12–15.9)
IMM GRANULOCYTES # BLD AUTO: 0.1 10*3/MM3 (ref 0–0.05)
IMM GRANULOCYTES NFR BLD AUTO: 0.5 % (ref 0–0.5)
LYMPHOCYTES # BLD AUTO: 2.63 10*3/MM3 (ref 0.7–3.1)
LYMPHOCYTES NFR BLD AUTO: 12.9 % (ref 19.6–45.3)
MAGNESIUM SERPL-MCNC: 1.6 MG/DL (ref 1.6–2.6)
MCH RBC QN AUTO: 29.9 PG (ref 26.6–33)
MCHC RBC AUTO-ENTMCNC: 32.9 G/DL (ref 31.5–35.7)
MCV RBC AUTO: 90.8 FL (ref 79–97)
MONOCYTES # BLD AUTO: 0.95 10*3/MM3 (ref 0.1–0.9)
MONOCYTES NFR BLD AUTO: 4.7 % (ref 5–12)
NEUTROPHILS NFR BLD AUTO: 16.6 10*3/MM3 (ref 1.7–7)
NEUTROPHILS NFR BLD AUTO: 81.5 % (ref 42.7–76)
NRBC BLD AUTO-RTO: 0 /100 WBC (ref 0–0.2)
PLATELET # BLD AUTO: 402 10*3/MM3 (ref 140–450)
PMV BLD AUTO: 8.7 FL (ref 6–12)
POTASSIUM SERPL-SCNC: 3.6 MMOL/L (ref 3.5–5.2)
PROCALCITONIN SERPL-MCNC: 0.11 NG/ML (ref 0–0.25)
RBC # BLD AUTO: 4.35 10*6/MM3 (ref 3.77–5.28)
SODIUM SERPL-SCNC: 139 MMOL/L (ref 136–145)
WBC NRBC COR # BLD AUTO: 20.36 10*3/MM3 (ref 3.4–10.8)

## 2023-12-15 PROCEDURE — 25010000002 THIAMINE HCL 200 MG/2ML SOLUTION: Performed by: STUDENT IN AN ORGANIZED HEALTH CARE EDUCATION/TRAINING PROGRAM

## 2023-12-15 PROCEDURE — 25810000003 SODIUM CHLORIDE 0.9 % SOLUTION: Performed by: INTERNAL MEDICINE

## 2023-12-15 PROCEDURE — 94799 UNLISTED PULMONARY SVC/PX: CPT

## 2023-12-15 PROCEDURE — 82948 REAGENT STRIP/BLOOD GLUCOSE: CPT

## 2023-12-15 PROCEDURE — 93010 ELECTROCARDIOGRAM REPORT: CPT | Performed by: INTERNAL MEDICINE

## 2023-12-15 PROCEDURE — 94761 N-INVAS EAR/PLS OXIMETRY MLT: CPT

## 2023-12-15 PROCEDURE — 80048 BASIC METABOLIC PNL TOTAL CA: CPT | Performed by: FAMILY MEDICINE

## 2023-12-15 PROCEDURE — 83735 ASSAY OF MAGNESIUM: CPT | Performed by: INTERNAL MEDICINE

## 2023-12-15 PROCEDURE — 84145 PROCALCITONIN (PCT): CPT | Performed by: INTERNAL MEDICINE

## 2023-12-15 PROCEDURE — 80143 DRUG ASSAY ACETAMINOPHEN: CPT | Performed by: FAMILY MEDICINE

## 2023-12-15 PROCEDURE — 25010000002 HEPARIN (PORCINE) PER 1000 UNITS: Performed by: FAMILY MEDICINE

## 2023-12-15 PROCEDURE — 25010000002 LORAZEPAM PER 2 MG: Performed by: STUDENT IN AN ORGANIZED HEALTH CARE EDUCATION/TRAINING PROGRAM

## 2023-12-15 PROCEDURE — 25010000002 MAGNESIUM SULFATE 4 GM/100ML SOLUTION: Performed by: INTERNAL MEDICINE

## 2023-12-15 PROCEDURE — 63710000001 ONDANSETRON PER 8 MG: Performed by: INTERNAL MEDICINE

## 2023-12-15 PROCEDURE — 93005 ELECTROCARDIOGRAM TRACING: CPT | Performed by: INTERNAL MEDICINE

## 2023-12-15 PROCEDURE — 85025 COMPLETE CBC W/AUTO DIFF WBC: CPT | Performed by: FAMILY MEDICINE

## 2023-12-15 PROCEDURE — 99233 SBSQ HOSP IP/OBS HIGH 50: CPT | Performed by: INTERNAL MEDICINE

## 2023-12-15 RX ORDER — LORAZEPAM 2 MG/ML
1 INJECTION INTRAMUSCULAR
Status: DISCONTINUED | OUTPATIENT
Start: 2023-12-15 | End: 2023-12-15

## 2023-12-15 RX ORDER — ALPRAZOLAM 1 MG/1
1 TABLET ORAL 3 TIMES DAILY PRN
Status: DISCONTINUED | OUTPATIENT
Start: 2023-12-15 | End: 2023-12-16 | Stop reason: HOSPADM

## 2023-12-15 RX ORDER — AMOXICILLIN 250 MG
1 CAPSULE ORAL NIGHTLY PRN
Status: DISCONTINUED | OUTPATIENT
Start: 2023-12-15 | End: 2023-12-16 | Stop reason: HOSPADM

## 2023-12-15 RX ORDER — LORAZEPAM 2 MG/ML
2 INJECTION INTRAMUSCULAR
Status: DISCONTINUED | OUTPATIENT
Start: 2023-12-15 | End: 2023-12-15

## 2023-12-15 RX ORDER — BISACODYL 10 MG
10 SUPPOSITORY, RECTAL RECTAL DAILY PRN
Status: DISCONTINUED | OUTPATIENT
Start: 2023-12-15 | End: 2023-12-16 | Stop reason: HOSPADM

## 2023-12-15 RX ORDER — MAGNESIUM SULFATE HEPTAHYDRATE 40 MG/ML
4 INJECTION, SOLUTION INTRAVENOUS ONCE
Status: COMPLETED | OUTPATIENT
Start: 2023-12-15 | End: 2023-12-16

## 2023-12-15 RX ORDER — LORAZEPAM 2 MG/ML
0.5 INJECTION INTRAMUSCULAR ONCE
Status: COMPLETED | OUTPATIENT
Start: 2023-12-16 | End: 2023-12-15

## 2023-12-15 RX ORDER — ACETAMINOPHEN 325 MG/1
650 TABLET ORAL EVERY 6 HOURS PRN
Status: DISCONTINUED | OUTPATIENT
Start: 2023-12-15 | End: 2023-12-16 | Stop reason: HOSPADM

## 2023-12-15 RX ORDER — POLYETHYLENE GLYCOL 3350 17 G/17G
17 POWDER, FOR SOLUTION ORAL DAILY PRN
Status: DISCONTINUED | OUTPATIENT
Start: 2023-12-15 | End: 2023-12-16 | Stop reason: HOSPADM

## 2023-12-15 RX ORDER — LORAZEPAM 2 MG/1
2 TABLET ORAL EVERY 6 HOURS
Status: DISCONTINUED | OUTPATIENT
Start: 2023-12-15 | End: 2023-12-15

## 2023-12-15 RX ORDER — ONDANSETRON 4 MG/1
4 TABLET, FILM COATED ORAL EVERY 6 HOURS PRN
Status: DISCONTINUED | OUTPATIENT
Start: 2023-12-15 | End: 2023-12-16 | Stop reason: HOSPADM

## 2023-12-15 RX ORDER — LORAZEPAM 0.5 MG/1
1 TABLET ORAL EVERY 6 HOURS
Status: DISCONTINUED | OUTPATIENT
Start: 2023-12-16 | End: 2023-12-15

## 2023-12-15 RX ORDER — LORAZEPAM 2 MG/1
2 TABLET ORAL
Status: DISCONTINUED | OUTPATIENT
Start: 2023-12-15 | End: 2023-12-15

## 2023-12-15 RX ORDER — SODIUM CHLORIDE 9 MG/ML
75 INJECTION, SOLUTION INTRAVENOUS CONTINUOUS
Status: DISCONTINUED | OUTPATIENT
Start: 2023-12-15 | End: 2023-12-16

## 2023-12-15 RX ORDER — LORAZEPAM 0.5 MG/1
1 TABLET ORAL
Status: DISCONTINUED | OUTPATIENT
Start: 2023-12-15 | End: 2023-12-15

## 2023-12-15 RX ORDER — THIAMINE HYDROCHLORIDE 100 MG/ML
200 INJECTION, SOLUTION INTRAMUSCULAR; INTRAVENOUS EVERY 8 HOURS SCHEDULED
Status: DISCONTINUED | OUTPATIENT
Start: 2023-12-15 | End: 2023-12-15

## 2023-12-15 RX ORDER — BISACODYL 5 MG/1
5 TABLET, DELAYED RELEASE ORAL DAILY PRN
Status: DISCONTINUED | OUTPATIENT
Start: 2023-12-15 | End: 2023-12-16 | Stop reason: HOSPADM

## 2023-12-15 RX ORDER — FOLIC ACID 1 MG/1
1 TABLET ORAL DAILY
Status: DISCONTINUED | OUTPATIENT
Start: 2023-12-15 | End: 2023-12-15

## 2023-12-15 RX ADMIN — ONDANSETRON HYDROCHLORIDE 4 MG: 4 TABLET, FILM COATED ORAL at 13:50

## 2023-12-15 RX ADMIN — LORAZEPAM 2 MG: 2 INJECTION INTRAMUSCULAR; INTRAVENOUS at 03:45

## 2023-12-15 RX ADMIN — MAGNESIUM SULFATE HEPTAHYDRATE 4 G: 40 INJECTION, SOLUTION INTRAVENOUS at 14:32

## 2023-12-15 RX ADMIN — ALPRAZOLAM 1 MG: 1 TABLET ORAL at 13:50

## 2023-12-15 RX ADMIN — Medication 10 ML: at 23:39

## 2023-12-15 RX ADMIN — HEPARIN SODIUM 5000 UNITS: 5000 INJECTION INTRAVENOUS; SUBCUTANEOUS at 06:14

## 2023-12-15 RX ADMIN — Medication 10 ML: at 09:32

## 2023-12-15 RX ADMIN — LORAZEPAM 1 MG: 2 INJECTION INTRAMUSCULAR; INTRAVENOUS at 02:18

## 2023-12-15 RX ADMIN — HEPARIN SODIUM 5000 UNITS: 5000 INJECTION INTRAVENOUS; SUBCUTANEOUS at 13:50

## 2023-12-15 RX ADMIN — Medication 10 ML: at 00:33

## 2023-12-15 RX ADMIN — SODIUM CHLORIDE 75 ML/HR: 9 INJECTION, SOLUTION INTRAVENOUS at 14:30

## 2023-12-15 RX ADMIN — THIAMINE HYDROCHLORIDE 200 MG: 100 INJECTION, SOLUTION INTRAMUSCULAR; INTRAVENOUS at 06:14

## 2023-12-15 RX ADMIN — LORAZEPAM 0.5 MG: 2 INJECTION INTRAMUSCULAR; INTRAVENOUS at 23:40

## 2023-12-15 RX ADMIN — ALPRAZOLAM 1 MG: 1 TABLET ORAL at 00:33

## 2023-12-15 RX ADMIN — HEPARIN SODIUM 5000 UNITS: 5000 INJECTION INTRAVENOUS; SUBCUTANEOUS at 23:40

## 2023-12-15 NOTE — PLAN OF CARE
Goal Outcome Evaluation:                   Patient remains alert and oriented x4. Medicated with PRN pain medication per MAR. Patient had c/o chest pain, vitals stable, provider aware, no interventions at this time. Patient states chest pain has resided. Close watch continues to be at bedside. Continuous fluids maintained as ordered. No new issues at this time.

## 2023-12-15 NOTE — PROGRESS NOTES
[facility]   Hospitalist Progress Note  Date: 12/15/2023  Patient Name: Romy Alonso  : 1995  MRN: [id number]  Date of admission: 2023      Subjective   Subjective     Chief Complaint: Follow up for intentional overdose    Summary: 29 y/o F with anxiety, depression who presented following suicide attempt/intentional overdose with Benadryl and alcohol. UDS +benzo and THC.  Ethanol 141. Salicylate and APAP neg. EKG NSR no Qtc prolongation.     Interval Followup:   Afebrile.  Tachycardic.  Telemetry showing sinus tachycardia  Doing okay.  Sitter at bedside.  Patient has been staying in bed since admission but participating in some conversation.  States she feels very anxious and has been dealing with increased depression stressors related to recent family events. she intentionally took pills to kill herself because of these events. She reports intrusive thoughts and has been having images in her head of family members dying.  She did drink some alcohol while taking pills but does not drink alcohol on a daily basis    Review of Systems  Cardiovascular: No chest pain  Respiratory:  No Cough, No Dyspnea  Gastrointestinal:  No Nausea, No Vomiting  : No dysuria or suprapubic pain  Neurological: No confusion, no headache, no seizures      Objective   Objective     Vitals:   Temp:  [97.7 °F (36.5 °C)-98.1 °F (36.7 °C)] 98.1 °F (36.7 °C)  Heart Rate:  [] 101  Resp:  [18-20] 18  BP: (102-133)/() 103/76  Physical Exam    Constitutional: Conversant, NAD   Respiratory: Clear, nonlabored   Cardiovascular: Regular, tachycardic, no murmurs, no edema   Gastrointestinal: Positive bowel sounds, soft, nontender, nondistended   Neurologic: Alert, Cranial Nerves grossly intact, speech clear   Psych: Withdrawn, flat affect, slow speech, fair eye contact   Skin: Extremities warm, no rashes    Result Review    Result Review:  I have personally reviewed the following over the last 24 hours (07:00 to  07:00) and agree with the following findings  [x]  Laboratory  CBC          12/14/2023    16:45 12/15/2023    05:57   CBC   WBC 7.56  20.36    RBC 4.63  4.35    Hemoglobin 13.8  13.0    Hematocrit 42.1  39.5    MCV 90.9  90.8    MCH 29.8  29.9    MCHC 32.8  32.9    RDW 12.9  13.0    Platelets 438  402      CMP          12/14/2023    16:45 12/15/2023    05:57   CMP   Glucose 89  96    BUN 4  6    Creatinine 0.88  0.82    EGFR 92.5  100.7    Sodium 145  139    Potassium 3.6  3.6    Chloride 107  103    Calcium 8.7  9.2    Total Protein 7.2     Albumin 4.5     Globulin 2.7     Total Bilirubin 0.2     Alkaline Phosphatase 114     AST (SGOT) 21     ALT (SGPT) 18     Albumin/Globulin Ratio 1.7     BUN/Creatinine Ratio 4.5  7.3    Anion Gap 14.2  14.2      Repeat Tylenol level <5.0.   CK WNL    []  Microbiology  [x]  Radiology CT head no acute process reported  [x]  EKG/Telemetry monitor personally reviewed: sinus tachycardia  []  Cardiology/Vascular   []  Pathology  []  Old records  []  Other:    Assessment & Plan   Assessment / Plan     Assessment/Plan:  Intentional overdose with Benadryl, initial encounter  Alcohol intoxication without complication  Sinus tachycardia  Leukocytosis, likely reactive  Anxiety  History of depression  THC positive on UDS         Tachycardic otherwise vital signs stable. No encephalopathy, delirium, seizures. CK WNL.  Repeat Tylenol level negative  Has new leukocytosis 20.3.  No signs or symptoms of pneumonia or UTI.  Low suspicion for infection.  Check Pro-Los.  Check UA.  Monitor fever curve  Start normal saline 75 cc/h  Give IV mag sulfate 4 g x 1  Repeat EKG to monitor Qtc.  No evidence of wide-complex tachycardia arrhythmia.  Continue telemetry  Anxiety does not appear to be severe.  Is not agitated. Increase Xanax to 1 mg every 8 hours prn (IV Ativan is on shortage)  Monitor bladder scans  Monitor CIWA score daily  Seizure precautions  Continue suicide precautions.  One-to-one  observation.  He is on 72-hour hold  Psychiatry consulted.  Appreciate assistance.  Will defer reinitiation of her regular psychiatric meds to their recommendation    Discussed with Dr Hamlin and RN.    DVT prophylaxis:  Medical DVT prophylaxis orders are present.    CODE STATUS:   Code Status (Patient has no pulse and is not breathing): CPR (Attempt to Resuscitate)  Medical Interventions (Patient has pulse or is breathing): Full Support    Electronically signed by Aristides Heath DO, 12/15/23, 7:43 AM EST.

## 2023-12-15 NOTE — H&P
[facility]   HISTORY AND PHYSICAL    Patient Name: Romy Alonso  : 1995  MRN: [id number]  Primary Care Physician:  Javid Niño NP  Date of admission: 2023    Subjective   Subjective     Chief Complaint:   Suicidal ideation  History of Present Illness  Patient is a 28-year-old female with past medical history of anxiety and depression.  She was brought into the emergency department after an intentional overdose with Benadryl or possibly combination allergy medication that included diphenhydramine.  EMS for stated the patient had taken over 100 Benadryl in an attempt to commit suicide.  After the patient arrived however.  Was more likely 40 to 50 tablets but again were unsure if it was a combination medication or just Benadryl.  Review of Systems   All other systems reviewed and are negative.       Personal History     Past Medical History:   Diagnosis Date    Anxiety     Depression        Past Surgical History:   Procedure Laterality Date     SECTION  02/10/2015       Family History: family history includes Hypertension in her father, mother, and another family member; Ovarian cancer in her mother; Skin cancer in an other family member. Otherwise pertinent FHx was reviewed and not pertinent to current issue.    Social History:  reports that she has quit smoking. Her smoking use included cigarettes. She has a 1.50 pack-year smoking history. She has never used smokeless tobacco. She reports current alcohol use. Drug use questions deferred to the physician.    Home Medications:  ALPRAZolam, ARIPiprazole, Levonorgestrel, benztropine, desvenlafaxine, doxepin, gabapentin, and lamoTRIgine    Allergies:  Allergies   Allergen Reactions    Latex Anaphylaxis       Objective    Objective     Vitals:   Temp:  [97.7 °F (36.5 °C)] 97.7 °F (36.5 °C)  Heart Rate:  [] 105  Resp:  [18] 18  BP: (107-133)/() 107/73    Physical Exam  Constitutional:  Well-developed and well-nourished.  No  acute distress.      HENT:  Head:  Normocephalic and atraumatic.  Mouth:  Moist mucous membranes.    Eyes:  Conjunctivae and EOM are normal. No scleral icterus.    Neck:  Neck supple.  No JVD present.    Cardiovascular:  Normal rate, regular rhythm and normal heart sounds with no murmur.  Pulmonary/Chest:  No respiratory distress, no wheezes, no crackles, with normal breath sounds and good air movement.  Abdominal:  Soft. No distension and no tenderness.   Musculoskeletal:  No tenderness, and no deformity.  No red or swollen joints anywhere.    Neurological:  Alert and oriented to person, place, and time.  No cranial nerve deficit.    Skin:  Skin is warm and dry. No rash noted. No pallor.   Peripheral vascular:  No clubbing, no cyanosis, no edema.  Psychiatric: Appropriate mood and affect  : Deferred  Result Review    Result Review:  I have personally reviewed the results from the time of this admission to 12/14/2023 20:32 EST and agree with these findings:  [x]  Laboratory list / accordion  []  Microbiology  [x]  Radiology  []  EKG/Telemetry   []  Cardiology/Vascular   []  Pathology  []  Old records  []  Other:  Most notable findings include:       Assessment & Plan   Assessment / Plan     Brief Patient Summary:  Romy Alonso is a 28 y.o. female who presents to the emergency department after intentional overdose.    Active Hospital Problems:  1.  Intentional overdose with diphenhydramine  2.  Major depression  3.  Suicidal ideation    Plan:   Patient will be admitted to the hospital service per recommendations from the Poison Control Center  They recommended monitoring for 6 to 8 hours with a repeat acetaminophen and aspirin level  We will consult psychiatry  Patient will be placed on suicide precautions with a one-to-one sitter    DVT prophylaxis:  No DVT prophylaxis order currently exists.    CODE STATUS:       Admission Status:  I believe this patient meets observation status.    Gabe Purcell,

## 2023-12-15 NOTE — SIGNIFICANT NOTE
"   12/15/23 1303   Behavior WDL   Behavior WDL WDL   Motor Movement no unusual gestures/mannerisms   Emotion Mood WDL   Emotion/Mood/Affect WDL   (Patient states \" I feel like shit\")   Affect flat   Emotion/Mood sad   Patient rated anxiety level 8   Mental Health   Little Interest or Pleasure in Doing Things 2-->more than half the days   Feeling Down, Depressed or Hopeless 2-->more than half the days   Do you feel stress - tense, restless, nervous, or anxious, or unable to sleep at night because your mind is troubled all the time - these days? Very much   Speech WDL   Speech WDL WDL   Perceptual State WDL   Perceptual State WDL WDL   Thought Process WDL   Thought Process WDL WDL   Coping/Stress   Major Change/Loss/Stressor other (see comments)  (\" the Holidays\")   Patient Personal Strengths strong support system   Sources of Support sibling(s);parent(s)   Techniques to [place] with Loss/Stress/Change medication;counseling   Developmental Stage ([place])   Developmental Stage Stage 6 (18-35 years/Young Adulthood) Intimacy vs. Isolation   C-SSRS (Recent)   Q1 Wished to be Dead (Past Month) yes   Q2 Suicidal Thoughts (Past Month) yes   Q3 Suicidal Thought Method yes   Q4 Suicidal Intent without Specific Plan yes   Q5 Suicide Intent with Specific Plan yes   Q6 Suicide Behavior (Lifetime) yes   Within the past 3 Months? yes   Level of Risk per Screen high risk   Violence Risk   Feels Like Hurting Others no   Previous Attempt to Harm Others no       SW met with patient at the bedside to complete psychosocial assessment. Patient in hospital due to an intentional overdose. Patient's sister at the bedside with patient upon arrival. Pt's mother entered the room during the assessment. Patient tells SW that she lives at home with her partner, and her daughter. Patient appears very flat during the assessment. She answers all questions appropriately, but does not seem to show emotion. Does become tearful some. Reports that she " "has been diagnosed with Bipolar disorder and ADHD in the past. Patient tells SW that she, she feels depressed, but that it comes in cycles. She states that the cycles of depression have been coming on more frequently. Patient states she feels very anxious at this time, and rated her anxiety at an 8. Patient tells SW that feels lonely a lot, and her [place] and that she tried to see her as much as she can. SW asked patient if she is currently having any major stressors or life events, she replies with \" the Holiday. Patient tells SW that she cannot remember the events that brought her into the hospital, but the family chimed in to tell SW that patient took a bottle of sleeping pills. They reported that a suicide note was found, and they received alarming texts and phone calls from patient. Patient left the house and was finally found outside and brought to the hospital. Patient denies a HX of substance abuse or alcohol abuse, but it is reported that there was drinking involved in this incident, and patient was also placed on the CIWA scale. Patient tells SW that she goes to [facility] for therapy and that she likes going there. Patient appears to have a very good family support system. SW discussed discharge planning with patient. Notified that she would need inpatient psych. She states she prefers to go to [facility]. SW will send a referral to [facility] and follow up.     Lisa Rubio MSW, CSW   "

## 2023-12-15 NOTE — DISCHARGE PLACEMENT REQUEST
"Vikas Smith (28 y.o. Female)       Date of Birth   1995    Social Security Number       Address   [address] [address]    Home Phone       MRN   [id number]       Sabianist   None    Marital Status   Single                            Admission Date   23    Admission Type   Emergency    Admitting Provider   Gabe Purcell DO    Attending Provider   Aristides Heath DO    Department, Room/Bed   [address], 4003/1       Discharge Date       Discharge Disposition       Discharge Destination                                 Attending Provider: Aristides Heath DO    Allergies: Latex    Isolation: None   Infection: None   Code Status: CPR    Ht: 162.6 cm (64\")   Wt: 72.3 kg (159 lb 6.3 oz)    Admission Cmt: None   Principal Problem: Intentional overdose [T50.902A]                   Active Insurance as of 2023       Primary Coverage       Payor Plan Insurance Group Employer/Plan Group    [employer] BY E[employer]RIQUE [employer] BY ENRRIQUE [id number]       Payor Plan Address Payor Plan Phone Number Payor Plan Fax Number Effective Dates    [address] [address]   2022 - None Entered    [facility] [address]         Subscriber Name Subscriber Birth Date Member ID       VIKAS SMITH 1995 [id number]                     Emergency Contacts            No emergency contacts on file.              Emergency Contact Information    None on File       Insurance Information                  [employer] BY E[employer]RIQUE/[employer] BY ENRRIQUE Phone: --    Subscriber: Vikas Smith Subscriber#: [id number]    Group#: [id number] Precert#: --             History & Physical        Gabe Purcell DO at 23          [facility]   HISTORY AND PHYSICAL    Patient Name: Vikas Smith  : 1995  MRN: [id number]  Primary Care Physician:  Javid Niño NP  Date of admission: 2023    Subjective  Subjective     Chief " Complaint:   Suicidal ideation  History of Present Illness  Patient is a 28-year-old female with past medical history of anxiety and depression.  She was brought into the emergency department after an intentional overdose with Benadryl or possibly combination allergy medication that included diphenhydramine.  EMS for stated the patient had taken over 100 Benadryl in an attempt to commit suicide.  After the patient arrived however.  Was more likely 40 to 50 tablets but again were unsure if it was a combination medication or just Benadryl.  Review of Systems   All other systems reviewed and are negative.       Personal History     Past Medical History:   Diagnosis Date    Anxiety     Depression        Past Surgical History:   Procedure Laterality Date     SECTION  02/10/2015       Family History: family history includes Hypertension in her father, mother, and another family member; Ovarian cancer in her mother; Skin cancer in an other family member. Otherwise pertinent FHx was reviewed and not pertinent to current issue.    Social History:  reports that she has quit smoking. Her smoking use included cigarettes. She has a 1.50 pack-year smoking history. She has never used smokeless tobacco. She reports current alcohol use. Drug use questions deferred to the physician.    Home Medications:  ALPRAZolam, ARIPiprazole, Levonorgestrel, benztropine, desvenlafaxine, doxepin, gabapentin, and lamoTRIgine    Allergies:  Allergies   Allergen Reactions    Latex Anaphylaxis       Objective   Objective     Vitals:   Temp:  [97.7 °F (36.5 °C)] 97.7 °F (36.5 °C)  Heart Rate:  [] 105  Resp:  [18] 18  BP: (107-133)/() 107/73    Physical Exam  Constitutional:  Well-developed and well-nourished.  No acute distress.      HENT:  Head:  Normocephalic and atraumatic.  Mouth:  Moist mucous membranes.    Eyes:  Conjunctivae and EOM are normal. No scleral icterus.    Neck:  Neck supple.  No JVD present.    Cardiovascular:   Normal rate, regular rhythm and normal heart sounds with no murmur.  Pulmonary/Chest:  No respiratory distress, no wheezes, no crackles, with normal breath sounds and good air movement.  Abdominal:  Soft. No distension and no tenderness.   Musculoskeletal:  No tenderness, and no deformity.  No red or swollen joints anywhere.    Neurological:  Alert and oriented to person, place, and time.  No cranial nerve deficit.    Skin:  Skin is warm and dry. No rash noted. No pallor.   Peripheral vascular:  No clubbing, no cyanosis, no edema.  Psychiatric: Appropriate mood and affect  : Deferred  Result Review   Result Review:  I have personally reviewed the results from the time of this admission to 12/14/2023 20:32 EST and agree with these findings:  [x]  Laboratory list / accordion  []  Microbiology  [x]  Radiology  []  EKG/Telemetry   []  Cardiology/Vascular   []  Pathology  []  Old records  []  Other:  Most notable findings include:       Assessment & Plan  Assessment / Plan     Brief Patient Summary:  Romy Alonso is a 28 y.o. female who presents to the emergency department after intentional overdose.    Active Hospital Problems:  1.  Intentional overdose with diphenhydramine  2.  Major depression  3.  Suicidal ideation    Plan:   Patient will be admitted to the hospital service per recommendations from the Poison Control Center  They recommended monitoring for 6 to 8 hours with a repeat acetaminophen and aspirin level  We will consult psychiatry  Patient will be placed on suicide precautions with a one-to-one sitter    DVT prophylaxis:  No DVT prophylaxis order currently exists.    CODE STATUS:       Admission Status:  I believe this patient meets observation status.    Gabe Purcell DO    Electronically signed by Gabe Purcell DO at 12/14/23 2046          Physician Progress Notes (all)        Aristides Heath DO at 12/15/23 0743           [facility] Hospitalist Progress Note  Date: 12/15/2023  Patient  Name: Romy Alonso  : 1995  MRN: [id number]  Date of admission: 2023      Subjective  Subjective     Chief Complaint: Follow up for intentional overdose    Summary: 29 y/o F with anxiety, depression who presented following suicide attempt/intentional overdose with Benadryl and alcohol. UDS +benzo and THC.  Ethanol 141. Salicylate and APAP neg. EKG NSR no Qtc prolongation.     Interval Followup:   Afebrile.  Tachycardic.  Telemetry showing sinus tachycardia  Doing okay.  Sitter at bedside.  Patient has been staying in bed since admission but participating in some conversation.  States she feels very anxious and has been dealing with increased depression stressors related to recent family events. she intentionally took pills to kill herself because of these events. She reports intrusive thoughts and has been having images in her head of family members dying.  She did drink some alcohol while taking pills but does not drink alcohol on a daily basis    Review of Systems  Cardiovascular: No chest pain  Respiratory:  No Cough, No Dyspnea  Gastrointestinal:  No Nausea, No Vomiting  : No dysuria or suprapubic pain  Neurological: No confusion, no headache, no seizures      Objective  Objective     Vitals:   Temp:  [97.7 °F (36.5 °C)-98.1 °F (36.7 °C)] 98.1 °F (36.7 °C)  Heart Rate:  [] 101  Resp:  [18-20] 18  BP: (102-133)/() 103/76  Physical Exam    Constitutional: Conversant, NAD   Respiratory: Clear, nonlabored   Cardiovascular: Regular, tachycardic, no murmurs, no edema   Gastrointestinal: Positive bowel sounds, soft, nontender, nondistended   Neurologic: Alert, Cranial Nerves grossly intact, speech clear   Psych: Withdrawn, flat affect, slow speech, fair eye contact   Skin: Extremities warm, no rashes    Result Review   Result Review:  I have personally reviewed the following over the last 24 hours (07:00 to 07:00) and agree with the following findings  [x]  Laboratory  CBC           12/14/2023    16:45 12/15/2023    05:57   CBC   WBC 7.56  20.36    RBC 4.63  4.35    Hemoglobin 13.8  13.0    Hematocrit 42.1  39.5    MCV 90.9  90.8    MCH 29.8  29.9    MCHC 32.8  32.9    RDW 12.9  13.0    Platelets 438  402      CMP          12/14/2023    16:45 12/15/2023    05:57   CMP   Glucose 89  96    BUN 4  6    Creatinine 0.88  0.82    EGFR 92.5  100.7    Sodium 145  139    Potassium 3.6  3.6    Chloride 107  103    Calcium 8.7  9.2    Total Protein 7.2     Albumin 4.5     Globulin 2.7     Total Bilirubin 0.2     Alkaline Phosphatase 114     AST (SGOT) 21     ALT (SGPT) 18     Albumin/Globulin Ratio 1.7     BUN/Creatinine Ratio 4.5  7.3    Anion Gap 14.2  14.2      Repeat Tylenol level <5.0.   CK WNL    []  Microbiology  [x]  Radiology CT head no acute process reported  [x]  EKG/Telemetry monitor personally reviewed: sinus tachycardia  []  Cardiology/Vascular   []  Pathology  []  Old records  []  Other:    Assessment & Plan  Assessment / Plan     Assessment/Plan:  Intentional overdose with Benadryl, initial encounter  Alcohol intoxication without complication  Sinus tachycardia  Leukocytosis, likely reactive  Anxiety  History of depression  THC positive on UDS         Tachycardic otherwise vital signs stable. No encephalopathy, delirium, seizures. CK WNL.  Repeat Tylenol level negative  Has new leukocytosis 20.3.  No signs or symptoms of pneumonia or UTI.  Low suspicion for infection.  Check Pro-Los.  Check UA.  Monitor fever curve  Start normal saline 75 cc/h  Give IV mag sulfate 4 g x 1  Repeat EKG to monitor Qtc.  No evidence of wide-complex tachycardia arrhythmia.  Continue telemetry  Anxiety does not appear to be severe.  Is not agitated. Increase Xanax to 1 mg every 8 hours prn (IV Ativan is on shortage)  Monitor bladder scans  Monitor CIWA score daily  Seizure precautions  Continue suicide precautions.  One-to-one observation.  He is on 72-hour hold  Psychiatry consulted.  Appreciate assistance.   Will defer reinitiation of her regular psychiatric meds to their recommendation    Discussed with Dr Hamlin and RN.    DVT prophylaxis:  Medical DVT prophylaxis orders are present.    CODE STATUS:   Code Status (Patient has no pulse and is not breathing): CPR (Attempt to Resuscitate)  Medical Interventions (Patient has pulse or is breathing): Full Support    Electronically signed by Aristides Heath DO, 12/15/23, 7:43 AM EST.      Electronically signed by Aristides Haeth DO at 12/15/23 1688

## 2023-12-15 NOTE — SIGNIFICANT NOTE
12/15/23 1115   Coping/Psychosocial   Observed Emotional State calm   Verbalized Emotional State relief;hopefulness   Trust Relationship/Rapport empathic listening provided   Involvement in Care interacting with patient   Additional Documentation Spiritual Care (Group)   Spiritual Care   Use of Spiritual Resources prayer;spirituality for coping, indicated strong use of   Spiritual Care Source  initiative   Spiritual Care Follow-Up follow-up, none required as presently assessed   Response to Spiritual Care receptive of support;engaged in conversation   Spiritual Care Interventions supportive conversation provided;prayer support provided   Spiritual Care Visit Type initial   Receptivity to Spiritual Care visit welcomed

## 2023-12-15 NOTE — PAYOR COMM NOTE
"Vikas Smith (28 y.o. Female)       Inpatient Auth Request. Admitted from ED.   Emergent Admission      PATIENT INFORMATION  Name:  Vikas Smith  MRN#:     [id number]  :  1995     ADMISSION INFORMATION  CLASS: Inpatient   DOS:  2023    CURRENT ATTENDING PROVIDER INFORMATION  Name/NPI: Aristides Heath DO [[id number]]  Phone:  Phone: [phone]    RENDERING FACILITY  Name:  [facility]   NPI:  [id number]  TID:  [id number]  Address:      [facility] [address] [place] [address]  Phone  [phone]        CASE MANAGEMENT CONTACT INFORMATION  Phone:      [phone]  Fax:           [phone]                    Date of Birth   1995    Social Security Number       Address   [address] [address]    Home Phone       MRN   [id number]       Mandaeism   None    Marital Status   Single                            Admission Date   23    Admission Type   Emergency    Admitting Provider   Gabe Purcell DO    Attending Provider   Aristides Heath DO    Department, Room/Bed   [address], 4003/1       Discharge Date       Discharge Disposition       Discharge Destination                                 Attending Provider: Aristides Heath DO    Allergies: Latex    Isolation: None   Infection: None   Code Status: CPR    Ht: 162.6 cm (64\")   Wt: 72.3 kg (159 lb 6.3 oz)    Admission Cmt: None   Principal Problem: Intentional overdose [T50.902A]                   Active Insurance as of 2023       Primary Coverage       Payor Plan Insurance Group Employer/Plan Group    [facility] BY ENRRIQUE [facility] BY ENRRIQUE [id number]       Payor Plan Address Payor Plan Phone Number Payor Plan Fax Number Effective Dates    [address] [address]   2022 - None Entered    [facility] [phone]         Subscriber Name Subscriber Birth Date Member ID       VIKAS SMITH 1995 [id number]                      Drug Ingestion " or Overdose RRG Inpatient Care       Indications Met   Last updated by Jelena Osorio RN on 12/15/2023 1136     Review Status Created By   Primary Completed Jelena Osorio RN      Criteria Review   Drug Ingestion or Overdose RRG Inpatient Care     Overall Determination: Indications Met     Criteria:  [×] Admission is indicated for  1 or more  of the following :      [×] Hemodynamic instability          12/15/2023 11:36 AM              -- 12/15/2023 11:36 AM by Jelena Osorio RN --                                    (X) Hemodynamic instability, as indicated by  1 or more  of the following  (1) (2) (3) (4) (5) (6):                  (X) Vital sign abnormality not readily corrected by appropriate treatment, as indicated by  1 or more  of the following  [A]:                  (X) Tachycardia that persists despite appropriate treatment (eg, volume repletion, treatment of pain, treatment of underlying cause)          12/15/2023 11:36 AM              -- 12/15/2023 11:36 AM by Jelena Osorio RN --                  -117, despite IV NS 1000ml bolus; Ativan IV & Zofran IV      [×] Potential for Dangerous arrhythmia requiring telemetry monitoring beyond observation care time frame (eg, overdose with known arrhythmogenic agent, such as tricyclic antidepressant, phenothiazine, hydroxychloroquine, or antiarrhythmic; drug-induced QTc greater than 500 msec or QRS greater than 120 msec)          12/15/2023 11:36 AM              -- 12/15/2023 11:36 AM by Jelena Osorio RN --                  Risk of Widening QRS.      [×] Altered mental status that is severe or persistent          12/15/2023 11:36 AM              -- 12/15/2023 11:36 AM by Jelena Osorio RN --                                    (X) Altered mental status (ie, different from baseline) that is severe or persistent, as indicated by  1 or more  of the following  (1) (2) (3) (4):                  (X) Confusional state (eg, disorientation, difficulty following commands,  deficit in attention) that persists (eg, for more than few hours) despite appropriate treatment (eg, of underlying cause)                  (X) Lethargy (awake or arousable, but with drowsiness; reduced awareness of self and environment) that persists (eg, for more than few hours) despite appropriate treatment (eg, of underlying cause)      [×] Psychiatric risk status not acceptable for outpatient management          12/15/2023 11:36 AM              -- 12/15/2023 11:36 AM by Jelena Osorio, RICK --                  Intentional OD of 30-40 diphenhydramine pills in attempt to kill self. Took with Alcohol.     Notes:  -- 12/15/2023 11:36 AM by Jelena Osorio, RN --      Retro Review for 23            To ED, s/p OD/suicide attempt with ingestion of 30-40 diphenhydramine tablets. Took these with ETOH. Appears anxious. + nausea. + tremors, Dilated pupils.  Mildly confused. -117.  Per Poison Control: Add CK & EKG to orders. Watch for widening QRS. Monitor Renal labs & urine output. Potential for delirium & seizures. Benzos should be 1st line of treatment for seizure.   PMHx: Depression, anxiety.   ED results: CK ok; 1st WBC 7.5. Repeat WBC (12 hrs later) 20.36. UDS: + Benzos; + THC. ETOH 141. EKG: Sinus tach Rate 108.   In ED: IV NS 1000 ml bolus; Ativan IV; Pepcid IV; Zofran IV. 72hr Hold initiated.      Admit: Remote telemetry. Consult Psychiatry. Suicide precautions; O2;  CIWA protocol. seizure precautions (high risk w/Benadryl OD). Ativan IV prn per CIWA. Thiamine IV qd;   Labs in AM; Heparin SQ.      Post admission, CIWA score increased to 20 with +Severe tremors, +Nausea; +headache; + anxiety, + agitation. Symptoms improved with Ativan IV.                               History & Physical        Gabe Purcell DO at 23          [facility]   HISTORY AND PHYSICAL    Patient Name: Romy Alonso  : 1995  MRN: [id number]  Primary Care Physician:  Javid Niño NP  Date of admission:  2023    Subjective  Subjective     Chief Complaint:   Suicidal ideation  History of Present Illness  Patient is a 28-year-old female with past medical history of anxiety and depression.  She was brought into the emergency department after an intentional overdose with Benadryl or possibly combination allergy medication that included diphenhydramine.  EMS for stated the patient had taken over 100 Benadryl in an attempt to commit suicide.  After the patient arrived however.  Was more likely 40 to 50 tablets but again were unsure if it was a combination medication or just Benadryl.  Review of Systems   All other systems reviewed and are negative.       Personal History     Past Medical History:   Diagnosis Date   • Anxiety    • Depression        Past Surgical History:   Procedure Laterality Date   •  SECTION  02/10/2015       Family History: family history includes Hypertension in her father, mother, and another family member; Ovarian cancer in her mother; Skin cancer in an other family member. Otherwise pertinent FHx was reviewed and not pertinent to current issue.    Social History:  reports that she has quit smoking. Her smoking use included cigarettes. She has a 1.50 pack-year smoking history. She has never used smokeless tobacco. She reports current alcohol use. Drug use questions deferred to the physician.    Home Medications:  ALPRAZolam, ARIPiprazole, Levonorgestrel, benztropine, desvenlafaxine, doxepin, gabapentin, and lamoTRIgine    Allergies:  Allergies   Allergen Reactions   • Latex Anaphylaxis       Objective   Objective     Vitals:   Temp:  [97.7 °F (36.5 °C)] 97.7 °F (36.5 °C)  Heart Rate:  [] 105  Resp:  [18] 18  BP: (107-133)/() 107/73    Physical Exam  Constitutional:  Well-developed and well-nourished.  No acute distress.      HENT:  Head:  Normocephalic and atraumatic.  Mouth:  Moist mucous membranes.    Eyes:  Conjunctivae and EOM are normal. No scleral icterus.    Neck:   Neck supple.  No JVD present.    Cardiovascular:  Normal rate, regular rhythm and normal heart sounds with no murmur.  Pulmonary/Chest:  No respiratory distress, no wheezes, no crackles, with normal breath sounds and good air movement.  Abdominal:  Soft. No distension and no tenderness.   Musculoskeletal:  No tenderness, and no deformity.  No red or swollen joints anywhere.    Neurological:  Alert and oriented to person, place, and time.  No cranial nerve deficit.    Skin:  Skin is warm and dry. No rash noted. No pallor.   Peripheral vascular:  No clubbing, no cyanosis, no edema.  Psychiatric: Appropriate mood and affect  : Deferred  Result Review   Result Review:  I have personally reviewed the results from the time of this admission to 12/14/2023 20:32 EST and agree with these findings:  [x]  Laboratory list / accordion  []  Microbiology  [x]  Radiology  []  EKG/Telemetry   []  Cardiology/Vascular   []  Pathology  []  Old records  []  Other:  Most notable findings include:       Assessment & Plan  Assessment / Plan     Brief Patient Summary:  Romy Alonso is a 28 y.o. female who presents to the emergency department after intentional overdose.    Active Hospital Problems:  1.  Intentional overdose with diphenhydramine  2.  Major depression  3.  Suicidal ideation    Plan:   Patient will be admitted to the hospital service per recommendations from the Poison Control Center  They recommended monitoring for 6 to 8 hours with a repeat acetaminophen and aspirin level  We will consult psychiatry  Patient will be placed on suicide precautions with a one-to-one sitter    DVT prophylaxis:  No DVT prophylaxis order currently exists.    CODE STATUS:       Admission Status:  I believe this patient meets observation status.    Gabe Purcell DO    Electronically signed by Gabe Purcell DO at 12/14/23 2046          Emergency Department Notes        Temitope Bustamante RN at 12/14/23 1923          Poison control recommends  observation for 6-8 hours minimum and until ALL symptoms are resolved.    Electronically signed by Temitope Bustamante RN at 23       Jak Harrell MD at 23          Time: 6:16 PM EST  Date of encounter:  2023  Independent Historian/Clinical History and Information was obtained by:   Patient and Nursing Staff    History is limited by: N/A    Chief Complaint: Intentional overdose      History of Present Illness:  Patient is a 28 y.o. year old female who presents to the emergency department for evaluation of intentional overdose/suicide attempt.  Patient states she took 30-40 diphenhydramine tablets in the past 4 to 5 hours.  This was an attempt to kill herself.  She also has had alcohol today.  She denies any other coingestants.    HPI    Patient Care Team  Primary Care Provider: Javid Niño NP    Past Medical History:     Allergies   Allergen Reactions   • Latex Anaphylaxis     Past Medical History:   Diagnosis Date   • Anxiety    • Depression      Past Surgical History:   Procedure Laterality Date   •  SECTION  02/10/2015     Family History   Problem Relation Age of Onset   • Hypertension Mother    • Ovarian cancer Mother         30'S   • Hypertension Father    • Skin cancer Other    • Hypertension Other        Home Medications:  Prior to Admission medications    Medication Sig Start Date End Date Taking? Authorizing Provider   ALPRAZolam (XANAX) 1 MG tablet Take 1 tablet by mouth.    ProviderMagdalena MD   ARIPiprazole (Abilify) 15 MG tablet Take 15 mg by mouth Daily.    Emergency, Nurse Angelica, RN   benztropine (COGENTIN) 1 MG tablet Take 1 tablet by mouth.    ProviderMagdalena MD   desvenlafaxine (PRISTIQ) 50 MG 24 hr tablet Take 1 tablet by mouth Every Morning. 10/27/23   ProviderMagdalena MD   doxepin (SINEquan) 50 MG capsule Take 1 capsule by mouth.    ProviderMagdalena MD   gabapentin (NEURONTIN) 600 MG tablet Take 600 mg by mouth 3 (Three) Times a Day.     "Emergency, Nurse Angelica, RICK   lamoTRIgine (LaMICtal) 200 MG tablet Take 1 tablet by mouth Daily.    Provider, MD Magdalena   Levonorgestrel (Mirena, 52 MG,) 20 MCG/DAY intrauterine device IUD 1 each by Intrauterine route.    Emergency, Nurse RICK Dominguez        Social History:   Social History     Tobacco Use   • Smoking status: Former     Packs/day: 0.50     Years: 3.00     Additional pack years: 0.00     Total pack years: 1.50     Types: Cigarettes   • Smokeless tobacco: Never   Vaping Use   • Vaping Use: Never used   Substance Use Topics   • Alcohol use: Yes     Comment: occasional   • Drug use: Defer         Review of Systems:  Review of Systems   Constitutional:  Negative for chills and fever.   HENT:  Negative for congestion, rhinorrhea and sore throat.    Eyes:  Negative for photophobia.   Respiratory:  Negative for apnea, cough, chest tightness and shortness of breath.    Cardiovascular:  Negative for chest pain and palpitations.   Gastrointestinal:  Positive for nausea. Negative for abdominal pain, diarrhea and vomiting.   Endocrine: Negative.    Genitourinary:  Negative for difficulty urinating and dysuria.   Musculoskeletal:  Negative for back pain, joint swelling and myalgias.   Skin:  Negative for color change and wound.   Allergic/Immunologic: Negative.    Neurological:  Positive for tremors. Negative for seizures and headaches.   Psychiatric/Behavioral: Negative.  The patient is nervous/anxious.    All other systems reviewed and are negative.       Physical Exam:  /93   Pulse 98   Temp 97.7 °F (36.5 °C) (Oral)   Resp 18   Ht 162.6 cm (64\")   Wt 76.8 kg (169 lb 5 oz)   LMP  (LMP Unknown) Comment: does not have periods with IUD, hasn't had one in 5 years  SpO2 99%   BMI 29.06 kg/m²     Physical Exam  Vitals and nursing note reviewed.   Constitutional:       General: She is awake.      Appearance: Normal appearance.   HENT:      Head: Normocephalic and atraumatic.      Nose: Nose normal.      " Mouth/Throat:      Mouth: Mucous membranes are moist.   Eyes:      Extraocular Movements: Extraocular movements intact.      Pupils: Pupils are equal, round, and reactive to light.      Comments: Dilated pupils   Cardiovascular:      Rate and Rhythm: Normal rate and regular rhythm.      Heart sounds: Normal heart sounds.   Pulmonary:      Effort: Pulmonary effort is normal. No respiratory distress.      Breath sounds: Normal breath sounds. No wheezing, rhonchi or rales.   Abdominal:      General: Bowel sounds are normal.      Palpations: Abdomen is soft.      Tenderness: There is no abdominal tenderness. There is no guarding or rebound.      Comments: No rigidity   Musculoskeletal:         General: No tenderness. Normal range of motion.      Cervical back: Normal range of motion and neck supple.   Skin:     General: Skin is warm and dry.      Coloration: Skin is not jaundiced.   Neurological:      General: No focal deficit present.      Mental Status: She is alert.      Comments: Fully alert   Psychiatric:      Comments: Tremulous, mildly confused, anxious                  Procedures:  Procedures      Medical Decision Making:      Comorbidities that affect care:    Depression/anxiety    External Notes reviewed:    Previous Clinic Note: Family medicine outpatient visit 10/24/2023 for tinea corporis      The following orders were placed and all results were independently analyzed by me:  Orders Placed This Encounter   Procedures   • Campus Draw   • Comprehensive Metabolic Panel   • Acetaminophen Level   • Ethanol   • Salicylate Level   • Urine Drug Screen - Urine, Clean Catch   • CBC Auto Differential   • CK   • NPO Diet NPO Type: Strict NPO   • Continuous Pulse Oximetry   • Vital Signs   • Undress & Gown   • Psych / Access to See   • Hospitalist (on-call MD unless specified)   • Oxygen Therapy- Nasal Cannula; Titrate 1-6 LPM Per SpO2; 90 - 95%   • POC Glucose Once   • ECG 12 Lead Drug Monitoring   • Insert  Peripheral IV   • Legal Status 72 Hour Hold   • Suicide Precautions   • CBC & Differential   • Green Top (Gel)   • Lavender Top   • Gold Top - SST   • Light Blue Top       Medications Given in the Emergency Department:  Medications   sodium chloride 0.9 % flush 10 mL (has no administration in time range)   famotidine (PEPCID) injection 20 mg (has no administration in time range)   ondansetron (ZOFRAN) injection 4 mg (has no administration in time range)   LORazepam (ATIVAN) injection 1 mg (1 mg Intravenous Given 12/14/23 1900)   sodium chloride 0.9 % bolus 1,000 mL (1,000 mL Intravenous New Bag 12/14/23 1900)        ED Course:    ED Course as of 12/14/23 2007   Thu Dec 14, 2023   1850 I have personally interpreted the EKG today and it shows no evidence of any acute ischemia or heart arrhythmia.  Sinus rhythm, heart rate 98 [RP]      ED Course User Index  [RP] Jak Harrell MD       Labs:    Lab Results (last 24 hours)       Procedure Component Value Units Date/Time    CBC & Differential [[id number]]  (Abnormal) Collected: 12/14/23 1645    Specimen: Blood Updated: 12/14/23 1652    Narrative:      The following orders were created for panel order CBC & Differential.  Procedure                               Abnormality         Status                     ---------                               -----------         ------                     CBC Auto Differential[[id number]]        Abnormal            Final result                 Please view results for these tests on the individual orders.    Comprehensive Metabolic Panel [[id number]]  (Abnormal) Collected: 12/14/23 1645    Specimen: Blood Updated: 12/14/23 1739     Glucose 89 mg/dL      BUN 4 mg/dL      Creatinine 0.88 mg/dL      Sodium 145 mmol/L      Potassium 3.6 mmol/L      Comment: Slight hemolysis detected by analyzer. Result may be falsely elevated.        Chloride 107 mmol/L      CO2 23.8 mmol/L      Calcium 8.7 mg/dL      Total Protein 7.2 g/dL      Albumin  4.5 g/dL      ALT (SGPT) 18 U/L      AST (SGOT) 21 U/L      Comment: Slight hemolysis detected by analyzer. Result may be falsely elevated.        Alkaline Phosphatase 114 U/L      Total Bilirubin 0.2 mg/dL      Globulin 2.7 gm/dL      A/G Ratio 1.7 g/dL      BUN/Creatinine Ratio 4.5     Anion Gap 14.2 mmol/L      eGFR 92.5 mL/min/1.73     Narrative:      GFR Normal >60  Chronic Kidney Disease <60  Kidney Failure <15      Acetaminophen Level [[id number]]  (Normal) Collected: 12/14/23 1645    Specimen: Blood Updated: 12/14/23 1717     Acetaminophen <5.0 mcg/mL     Ethanol [[id number]]  (Abnormal) Collected: 12/14/23 1645    Specimen: Blood Updated: 12/14/23 1717     Ethanol 141 mg/dL      Ethanol % 0.141 %     Narrative:      Ethanol (Plasma)  <10 Essentially Negative    Toxic Concentrations           mg/dL    Flushing, slowing of reflexes    Impaired visual activity         Depression of CNS              >100  Possible Coma                  >300       Salicylate Level [[id number]]  (Normal) Collected: 12/14/23 1645    Specimen: Blood Updated: 12/14/23 1717     Salicylate <0.3 mg/dL     CBC Auto Differential [[id number]]  (Abnormal) Collected: 12/14/23 1645    Specimen: Blood Updated: 12/14/23 1652     WBC 7.56 10*3/mm3      RBC 4.63 10*6/mm3      Hemoglobin 13.8 g/dL      Hematocrit 42.1 %      MCV 90.9 fL      MCH 29.8 pg      MCHC 32.8 g/dL      RDW 12.9 %      RDW-SD 42.5 fl      MPV 8.6 fL      Platelets 438 10*3/mm3      Neutrophil % 63.9 %      Lymphocyte % 30.6 %      Monocyte % 3.8 %      Eosinophil % 0.9 %      Basophil % 0.5 %      Immature Grans % 0.3 %      Neutrophils, Absolute 4.83 10*3/mm3      Lymphocytes, Absolute 2.31 10*3/mm3      Monocytes, Absolute 0.29 10*3/mm3      Eosinophils, Absolute 0.07 10*3/mm3      Basophils, Absolute 0.04 10*3/mm3      Immature Grans, Absolute 0.02 10*3/mm3      nRBC 0.0 /100 WBC     CK [[id number]]  (Normal) Collected: 12/14/23 1645    Specimen: Blood Updated:  12/14/23 1717     Creatine Kinase 59 U/L     Urine Drug Screen - Urine, Clean Catch [[id number]]  (Abnormal) Collected: 12/14/23 1900    Specimen: Urine, Clean Catch Updated: 12/14/23 1942     Amphet/Methamphet, Screen Negative     Barbiturates Screen, Urine Negative     Benzodiazepine Screen, Urine Positive     Cocaine Screen, Urine Negative     Opiate Screen Negative     THC, Screen, Urine Positive     Methadone Screen, Urine Negative     Oxycodone Screen, Urine Negative     Fentanyl, Urine Negative    Narrative:      Negative Thresholds Per Drugs Screened:    Amphetamines                 500 ng/ml  Barbiturates                 200 ng/ml  Benzodiazepines              100 ng/ml  Cocaine                      300 ng/ml  Methadone                    300 ng/ml  Opiates                      300 ng/ml  Oxycodone                    100 ng/ml  THC                           50 ng/ml  Fentanyl                       5 ng/ml      The Normal Value for all drugs tested is negative. This report includes final unconfirmed screening results to be used for medical treatment purposes only. Unconfirmed results must not be used for non-medical purposes such as employment or legal testing. Clinical consideration should be applied to any drug of abuse test, particularly when unconfirmed results are used.                     Imaging:    No Radiology Exams Resulted Within Past 24 Hours      Differential Diagnosis and Discussion:    Psychiatric: Differential diagnosis includes but is not limited to depression, psychosis, bipolar disorder, anxiety, manic episode, schizophrenia, and substance abuse.    All labs were reviewed and interpreted by me.  EKG was interpreted by me.    MDM               Patient Care Considerations:    CT HEAD: I considered ordering a noncontrast CT of the head, however patient did not have any head injury.      Consultants/Shared Management Plan:    Hospitalist: I have discussed the case with Dr. Purcell who agrees  to accept the patient for admission.    Social Determinants of Health:    Patient is independent, reliable, and has access to care.       Disposition and Care Coordination:    Admit:   Through independent evaluation of the patient's history, physical, and imperical data, the patient meets criteria for observation/admission to the hospital.      Final diagnoses:   Intentional diphenhydramine overdose, initial encounter   Alcoholic intoxication without complication        ED Disposition       ED Disposition   Decision to Admit    Condition   --    Comment   --               This medical record created using voice recognition software.    Jak Harrell MD  12/14/23 2008      Electronically signed by Jak Harrell MD at 12/14/23 2008       Temitope Bustamante, RN at 12/14/23 6047       Summary:Poison Control                 RN contacted Poison Control per OD protocol. PC advised to add a CK to existing lab work, to do an EKG and to watch for a widening QRS, and to monitor kidney function and urine output. PC also advised to watch for possible delirium and seizures and noted to use benzos as first line tx should a seizure occur. PC requested current vitals and asked to be updated should pt's condition change.     Electronically signed by Temitope Bustamante, RN at 12/14/23 2175       Vital Signs (last day)       Date/Time Temp Temp src Pulse Resp BP Patient Position SpO2    12/15/23 0733 -- -- 101 -- -- -- 94    12/15/23 0720 98.7 (37.1) Oral 109 18 104/65 Lying 96    12/15/23 0451 -- -- 98 -- -- -- 95    12/15/23 0233 98.1 (36.7) Oral 107 18 103/76 Lying 97    12/14/23 2300 97.9 (36.6) Oral 103 20 116/75 Sitting 98    12/14/23 2114 -- -- 117 -- 123/89 -- 99    12/14/23 2028 -- -- 113 -- 102/70 -- 99    12/14/23 2015 -- -- 105 -- 107/73 -- 98    12/14/23 1831 -- -- 98 -- -- -- 99    12/14/23 1830 -- -- 99 -- 118/93 -- --    12/14/23 1730 -- -- 111 -- 133/108 -- 94    12/14/23 1629 97.7 (36.5) Oral 105 18 109/87 -- 98           CIWA (last day)       Date/Time CIWA-Ar Score    12/15/23 0542 6    12/15/23 0351 11    12/15/23 0330 20    12/15/23 0300 --    12/15/23 0209 15    12/15/23 0200 15
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
## 2023-12-15 NOTE — PLAN OF CARE
Goal Outcome Evaluation:      Patient is alert and oriented x4 with forgetfulness and on room air. Patient was direct admission from ED. Admission skin assessment performed. CIWA precautions initiated, Lou GOMES aware. PRN ativan given per CIWA score. Pt appears anxious and flat. Close watch at bedside. No other issues at this time.

## 2023-12-16 VITALS
OXYGEN SATURATION: 99 % | SYSTOLIC BLOOD PRESSURE: 116 MMHG | HEART RATE: 93 BPM | DIASTOLIC BLOOD PRESSURE: 89 MMHG | RESPIRATION RATE: 18 BRPM | WEIGHT: 159.39 LBS | BODY MASS INDEX: 27.21 KG/M2 | TEMPERATURE: 98.6 F | HEIGHT: 64 IN

## 2023-12-16 LAB
ANION GAP SERPL CALCULATED.3IONS-SCNC: 11 MMOL/L (ref 5–15)
BUN SERPL-MCNC: 7 MG/DL (ref 6–20)
BUN/CREAT SERPL: 9.2 (ref 7–25)
CALCIUM SPEC-SCNC: 8.6 MG/DL (ref 8.6–10.5)
CHLORIDE SERPL-SCNC: 106 MMOL/L (ref 98–107)
CO2 SERPL-SCNC: 23 MMOL/L (ref 22–29)
CREAT SERPL-MCNC: 0.76 MG/DL (ref 0.57–1)
DEPRECATED RDW RBC AUTO: 42.6 FL (ref 37–54)
EGFRCR SERPLBLD CKD-EPI 2021: 110.3 ML/MIN/1.73
ERYTHROCYTE [DISTWIDTH] IN BLOOD BY AUTOMATED COUNT: 12.9 % (ref 12.3–15.4)
GLUCOSE SERPL-MCNC: 89 MG/DL (ref 65–99)
HCT VFR BLD AUTO: 40.3 % (ref 34–46.6)
HGB BLD-MCNC: 13.1 G/DL (ref 12–15.9)
MCH RBC QN AUTO: 29.5 PG (ref 26.6–33)
MCHC RBC AUTO-ENTMCNC: 32.5 G/DL (ref 31.5–35.7)
MCV RBC AUTO: 90.8 FL (ref 79–97)
PLATELET # BLD AUTO: 404 10*3/MM3 (ref 140–450)
PMV BLD AUTO: 8.8 FL (ref 6–12)
POTASSIUM SERPL-SCNC: 4 MMOL/L (ref 3.5–5.2)
QT INTERVAL: 335 MS
QT INTERVAL: 368 MS
QTC INTERVAL: 451 MS
QTC INTERVAL: 470 MS
RBC # BLD AUTO: 4.44 10*6/MM3 (ref 3.77–5.28)
SODIUM SERPL-SCNC: 140 MMOL/L (ref 136–145)
WBC NRBC COR # BLD AUTO: 9.89 10*3/MM3 (ref 3.4–10.8)

## 2023-12-16 PROCEDURE — 99239 HOSP IP/OBS DSCHRG MGMT >30: CPT | Performed by: INTERNAL MEDICINE

## 2023-12-16 PROCEDURE — 63710000001 ONDANSETRON PER 8 MG: Performed by: INTERNAL MEDICINE

## 2023-12-16 PROCEDURE — 85027 COMPLETE CBC AUTOMATED: CPT | Performed by: INTERNAL MEDICINE

## 2023-12-16 PROCEDURE — 25010000002 HEPARIN (PORCINE) PER 1000 UNITS: Performed by: FAMILY MEDICINE

## 2023-12-16 PROCEDURE — 80048 BASIC METABOLIC PNL TOTAL CA: CPT | Performed by: INTERNAL MEDICINE

## 2023-12-16 RX ORDER — CALCIUM CARBONATE 500 MG/1
1 TABLET, CHEWABLE ORAL ONCE
Status: DISCONTINUED | OUTPATIENT
Start: 2023-12-16 | End: 2023-12-16 | Stop reason: HOSPADM

## 2023-12-16 RX ORDER — CALCIUM CARBONATE 500 MG/1
1 TABLET, CHEWABLE ORAL ONCE AS NEEDED
Status: COMPLETED | OUTPATIENT
Start: 2023-12-16 | End: 2023-12-16

## 2023-12-16 RX ADMIN — ALPRAZOLAM 1 MG: 1 TABLET ORAL at 15:54

## 2023-12-16 RX ADMIN — Medication 10 ML: at 08:45

## 2023-12-16 RX ADMIN — HEPARIN SODIUM 5000 UNITS: 5000 INJECTION INTRAVENOUS; SUBCUTANEOUS at 07:36

## 2023-12-16 RX ADMIN — ALPRAZOLAM 1 MG: 1 TABLET ORAL at 08:45

## 2023-12-16 RX ADMIN — CALCIUM CARBONATE (ANTACID) CHEW TAB 500 MG 1 TABLET: 500 CHEW TAB at 14:05

## 2023-12-16 RX ADMIN — ONDANSETRON HYDROCHLORIDE 4 MG: 4 TABLET, FILM COATED ORAL at 10:49

## 2023-12-16 NOTE — PLAN OF CARE
Goal Outcome Evaluation:     AOX4. 72hr hold, CWA at bedside. Medicated for c/o anxiety per order. Emotional support provided to patient. Remote cardiac monitoring. Up ad faye. Discharge to [facility] via EMS.

## 2023-12-16 NOTE — DISCHARGE SUMMARY
[facility]         HOSPITALIST  DISCHARGE SUMMARY    Patient Name: Romy Alonso  : 1995  MRN: [id number]    Date of Admission: 2023  Date of Discharge:  23  Primary Care Physician: Javid Niño NP    Consults       Date and Time Order Name Status Description    2023 10:25 PM Inpatient Psychiatrist Consult Completed     2023  7:24 PM Hospitalist (on-call MD unless specified)              Active and Resolved Hospital Problems:  Intentional overdose with Benadryl, initial encounter  Alcohol intoxication without complication  Sinus tachycardia  Leukocytosis, likely reactive  Anxiety  History of depression  THC positive on UDS    Hospital Course     Hospital Course:  Romy Alonso is a 28 y.o. female  with anxiety, depression who presented following suicide attempt/intentional overdose with Benadryl and alcohol. UDS positive for benzodiazepines and THC.  Ethanol 141. Salicylate and APAP neg. no evidence of rhabdomyolysis.  EKG NSR no Qtc prolongation.  Poison control notified.  Admitted to monitored bed on suicide precautions. Managed with IV fluids and anxiolytics.  No arrhythmias on telemetry and EKG no QTc prolongation. Had no evidence of anticholinergic toxicity.  No lab abnormalities.  Her chronic psychiatric meds were held.  She was evaluated by psychiatry who recommended further inpatient psychiatric care.  Patient preferred to go to [facility];  she was discharged to their facility in stable condition    Day of Discharge     Vital Signs:  Temp:  [97.7 °F (36.5 °C)-98.7 °F (37.1 °C)] 98.2 °F (36.8 °C)  Heart Rate:  [88-98] 98  Resp:  [18] 18  BP: (102-122)/(66-79) 106/67  Physical Exam:   GENERAL: The patient is conversant and nontoxic.  HEENT: PERRLA, Oropharynx clear. Moist mucous membranes.   HEART: Regular rate and rhythm. No edema  LUNGS: Equal air entry, clear to auscultation bilaterally.  ABDOMEN: Soft, positive bowel sounds,  nontender, nondistended  SKIN: No rash or open wounds   NEUROLOGIC: Alert, cranial nerves grossly intact, speech clear    Discharge Details       HOME MEDICATION LIST (THESE WERE ALL HELD INPATIENT)     Discharge Medications        Continue These Medications        Instructions Start Date   Abilify 15 MG tablet  Generic drug: ARIPiprazole   15 mg, Oral, Nightly      ALPRAZolam 1 MG tablet  Commonly known as: XANAX   1 mg, Oral, 2 Times Daily PRN      benztropine 1 MG tablet  Commonly known as: COGENTIN   1 mg, Oral, 2 Times Daily      desvenlafaxine 50 MG 24 hr tablet  Commonly known as: PRISTIQ   50 mg, Oral, Every Morning      doxepin 25 MG capsule  Commonly known as: SINEquan   50 mg, Oral, Nightly      gabapentin 600 MG tablet  Commonly known as: NEURONTIN   600 mg, Oral, Daily      lamoTRIgine 200 MG tablet  Commonly known as: LaMICtal   200 mg, Oral, Daily      Mirena (52 MG) 20 MCG/DAY intrauterine device IUD  Generic drug: Levonorgestrel   1 each, Intrauterine               Allergies   Allergen Reactions    Latex Anaphylaxis       Discharge Disposition:  Psychiatric Hospital or Unit ([place] - External or [facility])    Diet:  Hospital:  Diet Order   Procedures    Diet: Regular/House Diet; Safe Tray; Texture: Regular Texture (IDDSI 7); Fluid Consistency: Thin (IDDSI 0)       Discharge Activity:   Activity Instructions       Activity as Tolerated              CODE STATUS:  Code Status and Medical Interventions:   Ordered at: 12/15/23 0746     Code Status (Patient has no pulse and is not breathing):    CPR (Attempt to Resuscitate)     Medical Interventions (Patient has pulse or is breathing):    Full Support         No future appointments.        Pertinent  and/or Most Recent Results     PROCEDURES:  NONE    LAB RESULTS:      Lab 12/16/23  0549 12/15/23  1142 12/15/23  0557 12/14/23  1645   WBC 9.89  --  20.36* 7.56   HEMOGLOBIN 13.1  --  13.0 13.8   HEMATOCRIT 40.3  --  39.5 42.1   PLATELETS 404  --  402 438    NEUTROS ABS  --   --  16.60* 4.83   IMMATURE GRANS (ABS)  --   --  0.10* 0.02   LYMPHS ABS  --   --  2.63 2.31   MONOS ABS  --   --  0.95* 0.29   EOS ABS  --   --  0.02 0.07   MCV 90.8  --  90.8 90.9   PROCALCITONIN  --  0.11  --   --          Lab 12/16/23  0549 12/15/23  0557 12/14/23  1645   SODIUM 140 139 145   POTASSIUM 4.0 3.6 3.6   CHLORIDE 106 103 107   CO2 23.0 21.8* 23.8   ANION GAP 11.0 14.2 14.2   BUN 7 6 4*   CREATININE 0.76 0.82 0.88   EGFR 110.3 100.7 92.5   GLUCOSE 89 96 89   CALCIUM 8.6 9.2 8.7   MAGNESIUM  --  1.6  --          Lab 12/14/23  1645   TOTAL PROTEIN 7.2   ALBUMIN 4.5   GLOBULIN 2.7   ALT (SGPT) 18   AST (SGOT) 21   BILIRUBIN 0.2   ALK PHOS 114                     Brief Urine Lab Results       None          Microbiology Results (last 10 days)       ** No results found for the last 240 hours. **            No radiology results for the last 7 days                 Labs Pending at Discharge:NONE    Time spent on Discharge including face to face service: >30 minutes    Electronically signed by Aristides Heath DO, 12/16/23, 12:44 PM EST.

## 2024-04-18 ENCOUNTER — APPOINTMENT (OUTPATIENT)
Dept: CT IMAGING | Facility: HOSPITAL | Age: 29
End: 2024-04-18
Payer: COMMERCIAL

## 2024-04-18 ENCOUNTER — HOSPITAL ENCOUNTER (EMERGENCY)
Facility: HOSPITAL | Age: 29
Discharge: HOME | End: 2024-04-18
Attending: EMERGENCY MEDICINE
Payer: COMMERCIAL

## 2024-04-18 VITALS
SYSTOLIC BLOOD PRESSURE: 99 MMHG | HEIGHT: 64 IN | RESPIRATION RATE: 17 BRPM | OXYGEN SATURATION: 100 % | HEART RATE: 77 BPM | DIASTOLIC BLOOD PRESSURE: 66 MMHG | TEMPERATURE: 98.9 F | BODY MASS INDEX: 27.48 KG/M2 | WEIGHT: 160.94 LBS

## 2024-04-18 DIAGNOSIS — R51.9 ACUTE NONINTRACTABLE HEADACHE, UNSPECIFIED HEADACHE TYPE: Primary | ICD-10-CM

## 2024-04-18 PROCEDURE — 25010000002 DROPERIDOL PER 5 MG: Performed by: EMERGENCY MEDICINE

## 2024-04-18 PROCEDURE — 99284 EMERGENCY DEPT VISIT MOD MDM: CPT

## 2024-04-18 PROCEDURE — 70450 CT HEAD/BRAIN W/O DYE: CPT

## 2024-04-18 PROCEDURE — 25810000003 SODIUM CHLORIDE 0.9 % SOLUTION: Performed by: EMERGENCY MEDICINE

## 2024-04-18 PROCEDURE — 96374 THER/PROPH/DIAG INJ IV PUSH: CPT

## 2024-04-18 PROCEDURE — 96361 HYDRATE IV INFUSION ADD-ON: CPT

## 2024-04-18 RX ORDER — DROPERIDOL 2.5 MG/ML
1.25 INJECTION, SOLUTION INTRAMUSCULAR; INTRAVENOUS ONCE
Status: COMPLETED | OUTPATIENT
Start: 2024-04-18 | End: 2024-04-18

## 2024-04-18 RX ADMIN — SODIUM CHLORIDE 1000 ML: 9 INJECTION, SOLUTION INTRAVENOUS at 10:24

## 2024-04-18 RX ADMIN — DROPERIDOL 1.25 MG: 2.5 INJECTION, SOLUTION INTRAMUSCULAR; INTRAVENOUS at 10:24

## 2024-04-18 NOTE — ED PROVIDER NOTES
Time: 9:59 AM EDT  Date of encounter:  2024  Independent Historian/Clinical History and Information was obtained by:   Patient    History is limited by: N/A    Chief Complaint: Headache      History of Present Illness:  Patient is a 29 y.o. year old female who presents to the emergency department for evaluation of headache.  Patient reports that she has had a headache that has been ongoing for the last 3 days.  Reports is mainly on her right side of her head.  Has had sensitivity to light, nausea, blurry vision.  States that it gradually came on and just has not gotten any better.  Denies any worsening or alleviating symptoms.  No other complaints this time    HPI    Patient Care Team  Primary Care Provider: Javid Niño NP    Past Medical History:     Allergies   Allergen Reactions    Latex Anaphylaxis     Past Medical History:   Diagnosis Date    Anxiety     Cystitis, interstitial     Depression      Past Surgical History:   Procedure Laterality Date     SECTION  02/10/2015     Family History   Problem Relation Age of Onset    Hypertension Mother     Ovarian cancer Mother         30'S    Drug abuse Mother     Suicide Attempts Mother     Hypertension Father     Coronary artery disease Father     Drug abuse Father     Suicide Attempts Father     Drug abuse Sister     Skin cancer Other     Hypertension Other        Home Medications:  Prior to Admission medications    Medication Sig Start Date End Date Taking? Authorizing Provider   ALPRAZolam (XANAX) 1 MG tablet Take 1 tablet by mouth 2 (Two) Times a Day As Needed for Anxiety.    Magdalena Can MD   ARIPiprazole (ABILIFY) 20 MG tablet  24   Magdalena Can MD   benztropine (COGENTIN) 1 MG tablet Take 1 tablet by mouth 2 (Two) Times a Day.    Magdalena Can MD   desvenlafaxine (PRISTIQ) 100 MG 24 hr tablet Take 1 tablet by mouth Every Morning. 24   Magdalena Can MD   doxepin (SINEquan) 50 MG capsule Take 2 capsules  "by mouth every night at bedtime. 3/20/24   ProviderMagdalena MD   lamoTRIgine (LaMICtal) 100 MG tablet Take 1 tablet by mouth Every 12 (Twelve) Hours. 1/29/24   Magdalena Can MD   Levonorgestrel (Mirena, 52 MG,) 20 MCG/DAY intrauterine device IUD 1 each by Intrauterine route.    Emergency, Nurse Epic, RN   doxepin (SINEquan) 25 MG capsule Take 2 capsules by mouth Every Night.  4/18/24  Magdalena Can MD        Social History:   Social History     Tobacco Use    Smoking status: Former     Average packs/day: 0.5 packs/day for 3.0 years (1.5 ttl pk-yrs)     Types: Cigarettes     Start date: 2016     Passive exposure: Past    Smokeless tobacco: Never   Vaping Use    Vaping status: Never Used   Substance Use Topics    Alcohol use: Yes     Comment: occasional    Drug use: Defer         Review of Systems:  Review of Systems   Neurological:  Positive for headaches.        Physical Exam:  /71 (BP Location: Right arm, Patient Position: Lying)   Pulse 79   Temp 98.8 °F (37.1 °C) (Oral)   Resp 16   Ht 162.6 cm (64\")   Wt 73 kg (160 lb 15 oz)   SpO2 100%   BMI 27.62 kg/m²     Physical Exam  Vitals and nursing note reviewed.   Constitutional:       Appearance: Normal appearance.   HENT:      Head: Normocephalic and atraumatic.   Eyes:      General: No scleral icterus.  Cardiovascular:      Rate and Rhythm: Normal rate and regular rhythm.      Heart sounds: Normal heart sounds.   Pulmonary:      Effort: Pulmonary effort is normal.      Breath sounds: Normal breath sounds.   Musculoskeletal:         General: Normal range of motion.      Cervical back: Normal range of motion.   Skin:     Findings: No rash.   Neurological:      General: No focal deficit present.      Mental Status: She is alert and oriented to person, place, and time.      Cranial Nerves: No cranial nerve deficit.      Motor: No weakness.                  Procedures:  Procedures      Medical Decision Making:      Comorbidities that " affect care:    None    External Notes reviewed:    Reviewed admission from 12/14/2023      The following orders were placed and all results were independently analyzed by me:  Orders Placed This Encounter   Procedures    CT Head Without Contrast       Medications Given in the Emergency Department:  Medications   sodium chloride 0.9 % bolus 1,000 mL (1,000 mL Intravenous New Bag 4/18/24 1024)   droperidol (INAPSINE) injection 1.25 mg (1.25 mg Intravenous Given 4/18/24 1024)        ED Course:    ED Course as of 04/18/24 1116   Thu Apr 18, 2024   1113 Patient is improved at this time.  Kendell BAKER. [MA]      ED Course User Index  [MA] Chris Seay MD       Labs:    Lab Results (last 24 hours)       ** No results found for the last 24 hours. **             Imaging:    CT Head Without Contrast    Result Date: 4/18/2024  CT HEAD WO CONTRAST-  Date of Exam: 4/18/2024 10:03 AM  Indication: headache.  Comparison: None available.  Technique: Axial CT images were obtained of the head without contrast administration.  Reconstructed coronal and sagittal images were also obtained. Automated exposure control and iterative construction methods were used.   Findings: There is no acute infarct or hemorrhage. No abnormal extra-axial fluid collections are identified. No mass effect or hydrocephalus. No acute skull fracture. Visualized paranasal sinuses and mastoid air cells are clear.  Globes and orbits appear within normal limits.      Impression:  1. No acute intracranial abnormality.    Electronically Signed By-Josias Martinez MD On:4/18/2024 10:16 AM         Differential Diagnosis and Discussion:    Headache: Differential diagnosis includes but is not limited to migraine, cluster headache, hypertension, tumor, subarachnoid bleeding, pseudotumor cerebri, temporal arteritis, infections, tension headache, and TMJ syndrome.    All labs were reviewed and interpreted by me.  CT scan radiology impression was interpreted by  me.    MDM       Patient is a 29-year-old female who presents with complaints of a headache.  Reports that it has been ongoing for the last 3 days.  Reports some photophobia as well as headache and nausea.  Imaging does not show any acute findings.  Has had improvement with droperidol here.  Patient is otherwise well-appearing and okay for discharge at this time.  Will DC and have patient follow-up as outpatient.  Appears to be likely a migraine.        Patient Care Considerations:          Consultants/Shared Management Plan:    None    Social Determinants of Health:    Patient is independent, reliable, and has access to care.       Disposition and Care Coordination:    Discharged: The patient is suitable and stable for discharge with no need for consideration of admission.    I have explained the patient´s condition, diagnoses and treatment plan based on the information available to me at this time. I have answered questions and addressed any concerns. The patient has a good  understanding of the patient´s diagnosis, condition, and treatment plan as can be expected at this point. The vital signs have been stable. The patient´s condition is stable and appropriate for discharge from the emergency department.      The patient will pursue further outpatient evaluation with the primary care physician or other designated or consulting physician as outlined in the discharge instructions. They are agreeable to this plan of care and follow-up instructions have been explained in detail. The patient has received these instructions in written format and have expressed an understanding of the discharge instructions. The patient is aware that any significant change in condition or worsening of symptoms should prompt an immediate return to this or the closest emergency department or call to 911.      Final diagnoses:   Acute nonintractable headache, unspecified headache type        ED Disposition       ED Disposition   Discharge     Condition   Stable    Comment   --               This medical record created using voice recognition software.             Chris Seay MD  04/18/24 8728

## 2024-04-19 NOTE — PROGRESS NOTES
"Chief Complaint   Patient presents with    Gynecologic Exam     Mirena placed about  8 years, discuss replacement        HPI:   29 y.o. . Presents for well woman exam. Contraception:  Mirena IUD, Contraception:  Female partner, No Contraceptive Needed  Menses:   no menses with IUD  Pain:  None  Last pap: normal per patient, records not available  Complaints:  Desires Mirena IUD remove and replace, expiring, using for menstrual suppression, same sex partner    Past Medical History:   Diagnosis Date    ADHD     Anxiety     Cystitis, interstitial     Depression     Migraine       Past Surgical History:   Procedure Laterality Date     SECTION  02/10/2015      Family History   Problem Relation Age of Onset    Hypertension Father     Coronary artery disease Father     Drug abuse Father     Suicide Attempts Father     Hypertension Mother     Ovarian cancer Mother         30'S    Drug abuse Mother     Suicide Attempts Mother     Drug abuse Sister     Skin cancer Other     Hypertension Other     Breast cancer Neg Hx     Uterine cancer Neg Hx     Colon cancer Neg Hx     Prostate cancer Neg Hx     Melanoma Neg Hx     Deep vein thrombosis Neg Hx     Pulmonary embolism Neg Hx      Allergies as of 2024 - Reviewed 2024   Allergen Reaction Noted    Latex Anaphylaxis 2014        PCP: does manage PMHx and preventative labs    /80   Pulse 104   Ht 162.6 cm (64\")   Wt 74.4 kg (164 lb)   LMP  (LMP Unknown) Comment: No menses w/ Mirena  Breastfeeding No   BMI 28.15 kg/m²     PHYSICAL EXAM: Chaperone present   General- NAD, alert and oriented, appropriate  Psych- Normal mood, good memory  Neck- No masses, no thyroid enlargement  Lymphatic- No palpable neck, axillary, or groin nodes  CV- Regular rhythm, no murmurs  Resp- CTA to bases, no wheezes  Abdomen- Soft, non distended, non tender, no masses  Breast left-  Bilaterally symmetrical, no masses, non tender, no nipple discharge  Breast right- " Bilaterally symmetrical, no masses, non tender, no nipple discharge  External genitalia- Normal female, no lesions  Urethra/meatus- Normal, no masses, non tender, no prolapse  Bladder- Normal, no masses, non tender, no prolapse  Vagina- Normal, no atrophy, no lesions, no discharge, no prolapse  Cvx- Normal, no lesions, no discharge, No cervical motion tenderness, IUD strings visable 2cm  Uterus- Normal size, shape & consistency.  Non tender, mobile.  Adnexa- No mass, non tender  Anus/Rectum/Perineum- Not performed  Ext- No edema, no cyanosis    Skin- No lesions, no rashes, no acanthosis nigricans       ASSESSMENT and PLAN:    Diagnoses and all orders for this visit:    1. Well woman exam (Primary)  -     IGP,rfx Aptima HPV All Pth    2. Encounter for routine checking of intrauterine contraceptive device (IUD)  -     hCG, Quantitative, Pregnancy; Future        Preventative:   BREAST HEALTH- Monthly self breast exam importance and how to reviewed. MMG and/or MRI (prn) reviewed per society guidelines and her individual history. Screening Imaging: Not medically needed  CERVICAL CANCER Screening- Reviewed current ASCCP guidelines for screening w and wo cotest HPV, age specific.  Screen: Updated today  COLON CANCER Screening- Reviewed current medical society guidelines and options.  Screen:  Not medically needed  SEXUAL HEALTH: STD screening recommended.  She declines.  She understands risks of STDs NLT infection (herself and current/future partners), infertility, chronic pain, potential future surgery/complications,  Safe sex and condoms  BONE HEALTH- Reviewed current medical society guidelines and options for testing, calcium and vit D intake.  Weight bearing exercise.  DEXA: Not medically needed  VACCINATIONS Recommended: Up to date  Importance discussed, risk being unvaccinated reviewed.  Questions answered  Genetic testing: Previously screened negative  Smoking status- NON SMOKER  Follow up PCP/Specialist PMHx and  Labs  TRACK MENSES, RTO if <q21d, >7d long, heavy or painful.    All BIRTH CONTROL options R/B/A/SE/E of each reviewed in detail.  SAFE SEX/condoms importance reviewed.    Desires Mirena IUD remove and replace   She understands the importance of having any ordered tests to be performed in a timely fashion.  The risks of not performing them include, but are not limited to, advanced cancer stages, bone loss from osteoporosis and/or subsequent increase in morbidity and/or mortality.  She is encouraged to review her results online and/or contact or office if she has questions.       Follow Up:  Return for schedule for Mirena IUD remove and replace.        Sho Jeffery, APRN  04/23/2024

## 2024-04-23 ENCOUNTER — OFFICE VISIT (OUTPATIENT)
Dept: OBSTETRICS AND GYNECOLOGY | Facility: CLINIC | Age: 29
End: 2024-04-23
Payer: COMMERCIAL

## 2024-04-23 VITALS
HEIGHT: 64 IN | BODY MASS INDEX: 28 KG/M2 | WEIGHT: 164 LBS | DIASTOLIC BLOOD PRESSURE: 80 MMHG | SYSTOLIC BLOOD PRESSURE: 113 MMHG | HEART RATE: 104 BPM

## 2024-04-23 DIAGNOSIS — Z01.419 WELL WOMAN EXAM: Primary | ICD-10-CM

## 2024-04-23 DIAGNOSIS — Z30.431 ENCOUNTER FOR ROUTINE CHECKING OF INTRAUTERINE CONTRACEPTIVE DEVICE (IUD): ICD-10-CM

## 2024-04-23 PROCEDURE — 99395 PREV VISIT EST AGE 18-39: CPT | Performed by: NURSE PRACTITIONER

## 2024-04-23 PROCEDURE — 1160F RVW MEDS BY RX/DR IN RCRD: CPT | Performed by: NURSE PRACTITIONER

## 2024-04-23 PROCEDURE — 99459 PELVIC EXAMINATION: CPT | Performed by: NURSE PRACTITIONER

## 2024-04-23 PROCEDURE — 2014F MENTAL STATUS ASSESS: CPT | Performed by: NURSE PRACTITIONER

## 2024-04-23 PROCEDURE — G0123 SCREEN CERV/VAG THIN LAYER: HCPCS | Performed by: NURSE PRACTITIONER

## 2024-04-23 PROCEDURE — 1159F MED LIST DOCD IN RCRD: CPT | Performed by: NURSE PRACTITIONER

## 2024-04-27 LAB
CONV .: NORMAL
CYTOLOGIST CVX/VAG CYTO: NORMAL
CYTOLOGY CVX/VAG DOC CYTO: NORMAL
CYTOLOGY CVX/VAG DOC THIN PREP: NORMAL
DX ICD CODE: NORMAL
Lab: NORMAL
OTHER STN SPEC: NORMAL
STAT OF ADQ CVX/VAG CYTO-IMP: NORMAL

## 2024-06-20 NOTE — PROGRESS NOTES
IUD Placement    No chief complaint on file.      Sex in last 2 weeks:  {YES No:70697}  cg:  {neg/pos:87161}  cg:  {neg/pos:04411}  Consent signed: {YES No:09961}  Last PAP date and result:***      Procedure was explained in detail.  Discussed risks and benefits of  IUD and its placement with the patient. 1 in 1000 risk of uterine perforation requiring further intervention as well as ~5% risk of expulsion. Risk of embedment and need for surgical removal addressed.  Common side effects such as, but not limited to, irregular bleeding especially for the first 3-6 months, headaches, breast tenderness, acne, and benign ovarian cysts discussed with the patient.   We discussed 30% chance of amenorrhea and 60-70% chance of decreased amount of menstrual bleeding. Patient is aware that IUDs do not protect against sexually transmitted infections.        Subjective:  Romy Alonso is a 29 y.o.  female here for a *** IUD today.  She has read the information packaging and signed the consent form. All questions were answered.     Objective:    There were no vitals taken for this visit.  General- NAD, alert and oriented, appropriate  Psych- Normal mood, good memory  Abdomen- Soft, non distended, non tender, no masses  External genitalia- Normal, no lesions  Vagina- Normal, no discharge  Cervix- Normal without lesion or discharge.    Procedure   Sterile specuum placed and cervix visualized. Cervix cleansed with betadine. Cervix was grapsed with single-toothed tenaculum.  Uterus sounded to {APUTERUSSOUND:78853}cm.  {APUTERUS:83682}.  The IUD was placed sterilely without difficulty, the strings were trimmed approximately 2 cm in length. All instruments removed from the vagina.     Patient tolerated the procedure well.    Assessment and Plan:  There are no diagnoses linked to this encounter.    .  PRECAUTIONS-- If she has any fevers >100.4F, and abdominal/pelvic pain, or bleeding > 1pad/2hours, she needs to call our  office or on call/ER (after hours/weekend).  She understands the potential risk of pelvic inflammatory disease and the potential for an effect on her future fertility if she does not seek immediate evaluation.  Cramping due to the IUD should be controlled with a OTC reliever/NSAID.  If not, she should call our office.  If she misses a period and has a positive pregnancy test she must immediately seek medical attention due to the risk of ectopic pregnancy which can be life threatening.  She understands removal can sometimes be difficult and can require surgery.    Follow Up:  No follow-ups on file.    {Separate E+M Time Carve Out (Optional):43554}  {Time Spent-Use for E/M Coding (Optional):83464}    Kristen Bleser, MA  06/25/2024    [facility]  [facility] [place] [address]  Dept: [phone]  Dept Fax: [phone]  Loc: [phone]  Loc Fax: [phone]

## 2024-06-25 ENCOUNTER — PROCEDURE VISIT (OUTPATIENT)
Dept: OBSTETRICS AND GYNECOLOGY | Facility: CLINIC | Age: 29
End: 2024-06-25
Payer: COMMERCIAL

## 2024-06-25 VITALS
SYSTOLIC BLOOD PRESSURE: 119 MMHG | DIASTOLIC BLOOD PRESSURE: 81 MMHG | HEIGHT: 64 IN | WEIGHT: 161 LBS | BODY MASS INDEX: 27.49 KG/M2 | HEART RATE: 97 BPM

## 2024-06-25 DIAGNOSIS — Z30.432 ENCOUNTER FOR IUD REMOVAL: Primary | ICD-10-CM

## 2024-06-25 DIAGNOSIS — Z30.430 ENCOUNTER FOR IUD INSERTION: ICD-10-CM

## 2024-06-25 LAB
B-HCG UR QL: NEGATIVE
EXPIRATION DATE: NORMAL
INTERNAL NEGATIVE CONTROL: NORMAL
INTERNAL POSITIVE CONTROL: NORMAL
Lab: NORMAL

## 2024-06-25 PROCEDURE — 81025 URINE PREGNANCY TEST: CPT | Performed by: STUDENT IN AN ORGANIZED HEALTH CARE EDUCATION/TRAINING PROGRAM

## 2024-06-25 PROCEDURE — 58300 INSERT INTRAUTERINE DEVICE: CPT | Performed by: STUDENT IN AN ORGANIZED HEALTH CARE EDUCATION/TRAINING PROGRAM

## 2024-06-25 PROCEDURE — 58301 REMOVE INTRAUTERINE DEVICE: CPT | Performed by: STUDENT IN AN ORGANIZED HEALTH CARE EDUCATION/TRAINING PROGRAM

## 2024-06-25 NOTE — PROGRESS NOTES
Procedures  IUD Removal and Insertion Procedure Note    IUD Removal    Type of IUD:  Mirena  Date of insertion:  known  Reason for removal:  Device expiration  Other relevant history/information:  none    Procedure Time Documentation  The risks of the procedure were reviewed with the patient including bleeding, infection and unlikely damage to the uterus and the benefits of the procedure were explained to the patient and Written informed consent was obtained    Procedure Details  IUD strings visible:  yes  Local anesthesia:  None  Tenaculum used:  None  Removal:  IUD strings grasped and IUD removed intact with gentle traction.  The patient tolerated the procedure well.    IUD Insertion    Indication: Desires long acting reversible contraception     Procedure Details   Urine pregnancy test was done and was NEGATIVE .  The risks (including infection, bleeding, pain, and uterine perforation) and benefits of the procedure were explained to the patient and Written informed consent was obtained.      Cervix cleansed with Betadine. Uterus sounded to 7 cm. IUD inserted without difficulty. String visible and trimmed.    IUD Information:  Mirena.    Condition:  Stable    Complications:  None noted and Patient tolerated the procedure well without complications.    Plan:  The patient was advised to call for any fever or for prolonged or severe pain or bleeding. She was advised to use OTC analgesics as needed for mild to moderate pain.     Muriel Ragland DO  6/25/2024 11:37 EDT

## 2024-08-12 ENCOUNTER — HOSPITAL ENCOUNTER (EMERGENCY)
Facility: HOSPITAL | Age: 29
Discharge: HOME | End: 2024-08-13
Attending: EMERGENCY MEDICINE
Payer: COMMERCIAL

## 2024-08-12 DIAGNOSIS — R41.82 ALTERED MENTAL STATUS, UNSPECIFIED ALTERED MENTAL STATUS TYPE: Primary | ICD-10-CM

## 2024-08-12 LAB
ALBUMIN SERPL-MCNC: 4.6 G/DL (ref 3.5–5.2)
ALBUMIN/GLOB SERPL: 1.5 G/DL
ALP SERPL-CCNC: 110 U/L (ref 39–117)
ALT SERPL W P-5'-P-CCNC: 17 U/L (ref 1–33)
ANION GAP SERPL CALCULATED.3IONS-SCNC: 13 MMOL/L (ref 5–15)
APAP SERPL-MCNC: <5 MCG/ML (ref 0–30)
AST SERPL-CCNC: 18 U/L (ref 1–32)
BASOPHILS # BLD AUTO: 0.06 10*3/MM3 (ref 0–0.2)
BASOPHILS NFR BLD AUTO: 0.5 % (ref 0–1.5)
BILIRUB SERPL-MCNC: 0.2 MG/DL (ref 0–1.2)
BUN SERPL-MCNC: 8 MG/DL (ref 6–20)
BUN/CREAT SERPL: 9 (ref 7–25)
CALCIUM SPEC-SCNC: 9.1 MG/DL (ref 8.6–10.5)
CHLORIDE SERPL-SCNC: 103 MMOL/L (ref 98–107)
CO2 SERPL-SCNC: 24 MMOL/L (ref 22–29)
CREAT SERPL-MCNC: 0.89 MG/DL (ref 0.57–1)
DEPRECATED RDW RBC AUTO: 42.7 FL (ref 37–54)
EGFRCR SERPLBLD CKD-EPI 2021: 90.7 ML/MIN/1.73
EOSINOPHIL # BLD AUTO: 0.15 10*3/MM3 (ref 0–0.4)
EOSINOPHIL NFR BLD AUTO: 1.2 % (ref 0.3–6.2)
ERYTHROCYTE [DISTWIDTH] IN BLOOD BY AUTOMATED COUNT: 13 % (ref 12.3–15.4)
ETHANOL BLD-MCNC: <10 MG/DL (ref 0–10)
ETHANOL UR QL: <0.01 %
GLOBULIN UR ELPH-MCNC: 3 GM/DL
GLUCOSE SERPL-MCNC: 166 MG/DL (ref 65–99)
HCT VFR BLD AUTO: 36.4 % (ref 34–46.6)
HGB BLD-MCNC: 11.9 G/DL (ref 12–15.9)
HOLD SPECIMEN: NORMAL
HOLD SPECIMEN: NORMAL
IMM GRANULOCYTES # BLD AUTO: 0.09 10*3/MM3 (ref 0–0.05)
IMM GRANULOCYTES NFR BLD AUTO: 0.7 % (ref 0–0.5)
LYMPHOCYTES # BLD AUTO: 3.77 10*3/MM3 (ref 0.7–3.1)
LYMPHOCYTES NFR BLD AUTO: 29.6 % (ref 19.6–45.3)
MCH RBC QN AUTO: 29.2 PG (ref 26.6–33)
MCHC RBC AUTO-ENTMCNC: 32.7 G/DL (ref 31.5–35.7)
MCV RBC AUTO: 89.2 FL (ref 79–97)
MONOCYTES # BLD AUTO: 0.65 10*3/MM3 (ref 0.1–0.9)
MONOCYTES NFR BLD AUTO: 5.1 % (ref 5–12)
NEUTROPHILS NFR BLD AUTO: 62.9 % (ref 42.7–76)
NEUTROPHILS NFR BLD AUTO: 8.02 10*3/MM3 (ref 1.7–7)
NRBC BLD AUTO-RTO: 0 /100 WBC (ref 0–0.2)
PLATELET # BLD AUTO: 356 10*3/MM3 (ref 140–450)
PMV BLD AUTO: 8.5 FL (ref 6–12)
POTASSIUM SERPL-SCNC: 3.7 MMOL/L (ref 3.5–5.2)
PROT SERPL-MCNC: 7.6 G/DL (ref 6–8.5)
RBC # BLD AUTO: 4.08 10*6/MM3 (ref 3.77–5.28)
SALICYLATES SERPL-MCNC: 1.8 MG/DL
SODIUM SERPL-SCNC: 140 MMOL/L (ref 136–145)
WBC NRBC COR # BLD AUTO: 12.74 10*3/MM3 (ref 3.4–10.8)
WHOLE BLOOD HOLD COAG: NORMAL
WHOLE BLOOD HOLD SPECIMEN: NORMAL

## 2024-08-12 PROCEDURE — 80179 DRUG ASSAY SALICYLATE: CPT

## 2024-08-12 PROCEDURE — 93005 ELECTROCARDIOGRAM TRACING: CPT

## 2024-08-12 PROCEDURE — 80053 COMPREHEN METABOLIC PANEL: CPT

## 2024-08-12 PROCEDURE — 82077 ASSAY SPEC XCP UR&BREATH IA: CPT

## 2024-08-12 PROCEDURE — 99284 EMERGENCY DEPT VISIT MOD MDM: CPT

## 2024-08-12 PROCEDURE — 85025 COMPLETE CBC W/AUTO DIFF WBC: CPT

## 2024-08-12 PROCEDURE — 80143 DRUG ASSAY ACETAMINOPHEN: CPT

## 2024-08-12 RX ORDER — SODIUM CHLORIDE 0.9 % (FLUSH) 0.9 %
10 SYRINGE (ML) INJECTION AS NEEDED
Status: DISCONTINUED | OUTPATIENT
Start: 2024-08-12 | End: 2024-08-13 | Stop reason: HOSPADM

## 2024-08-13 ENCOUNTER — APPOINTMENT (OUTPATIENT)
Dept: CT IMAGING | Facility: HOSPITAL | Age: 29
End: 2024-08-13
Payer: COMMERCIAL

## 2024-08-13 VITALS
TEMPERATURE: 98 F | SYSTOLIC BLOOD PRESSURE: 110 MMHG | WEIGHT: 167.11 LBS | OXYGEN SATURATION: 98 % | BODY MASS INDEX: 28.53 KG/M2 | HEIGHT: 64 IN | RESPIRATION RATE: 18 BRPM | DIASTOLIC BLOOD PRESSURE: 67 MMHG | HEART RATE: 96 BPM

## 2024-08-13 LAB
AMPHET+METHAMPHET UR QL: NEGATIVE
BARBITURATES UR QL SCN: NEGATIVE
BENZODIAZ UR QL SCN: POSITIVE
CANNABINOIDS SERPL QL: NEGATIVE
COCAINE UR QL: NEGATIVE
FENTANYL UR-MCNC: NEGATIVE NG/ML
METHADONE UR QL SCN: NEGATIVE
OPIATES UR QL: NEGATIVE
OXYCODONE UR QL SCN: NEGATIVE

## 2024-08-13 PROCEDURE — 80307 DRUG TEST PRSMV CHEM ANLYZR: CPT

## 2024-08-13 PROCEDURE — 70450 CT HEAD/BRAIN W/O DYE: CPT

## 2024-08-13 NOTE — ED NOTES
Spoke with poison control. Treat symptomatically and watch for CNS depression, ataxia, irritation, restlessness. Keep current care plan and keep patient until they are at baseline. Shouldn't see any tachycardia and/or dilation to pupils.

## 2024-08-13 NOTE — ED NOTES
Patients mom arrived at bedside to take her home, patient mom requesting to speak to a doctor before discharging home dt concern with care overnight. RN explained discharge paperwork to patient and family and informed provider Dr Coffman that patient mom would like to speak to provider.

## 2024-08-13 NOTE — ED TRIAGE NOTES
"Patient to ED from home after possible over dose on xanax. Patient mother states she was out to lunch with patient when patient started feeling \"flush and incoherent\".   Patient apparently took too many of her prescribed xanax.   It is unknown how many were taken. Upon arrival patient is pale, lethargic, pupils 6+.   "

## 2024-08-13 NOTE — ED PROVIDER NOTES
Time: 9:30 PM EDT  Date of encounter:  2024  Independent Historian/Clinical History and Information was obtained by:   Patient    History is limited by: N/A    Chief Complaint   Patient presents with    Drug Overdose         History of Present Illness:  Patient is a 29 y.o. year old female who presents to the emergency department for evaluation of possible overdose.  Patient is brought to the ED by her mother who reports patient is acting abnormally and is pale, believes she may have taken too much of her Xanax.  The mother reports she picked her up and they went out to eat at a restaurant, she noticed before eating that the patient appeared to be tired and as they were leaving the restaurant she was becoming pale and not making sense with her words.  Patient is awake but very slow to respond and confused, enlarged pupils. (TAMMY Riley, provider in triage)     Patient Care Team  Primary Care Provider: Provider, No Known    Past Medical History:     Allergies   Allergen Reactions    Latex Anaphylaxis     Past Medical History:   Diagnosis Date    ADHD     Anxiety     Cystitis, interstitial     Depression     Migraine      Past Surgical History:   Procedure Laterality Date     SECTION  02/10/2015     Family History   Problem Relation Age of Onset    Hypertension Father     Coronary artery disease Father     Drug abuse Father     Suicide Attempts Father     Hypertension Mother     Ovarian cancer Mother         30'S    Drug abuse Mother     Suicide Attempts Mother     Drug abuse Sister     Skin cancer Other     Hypertension Other     Breast cancer Neg Hx     Uterine cancer Neg Hx     Colon cancer Neg Hx     Prostate cancer Neg Hx     Melanoma Neg Hx     Deep vein thrombosis Neg Hx     Pulmonary embolism Neg Hx        Home Medications:  Prior to Admission medications    Medication Sig Start Date End Date Taking? Authorizing Provider   ALPRAZolam (XANAX) 1 MG tablet Take 1 tablet by mouth 2 (Two)  "Times a Day As Needed for Anxiety.    Magdalena Can MD   ARIPiprazole (ABILIFY) 20 MG tablet  2/21/24   Magdalena Can MD   benztropine (COGENTIN) 1 MG tablet Take 1 tablet by mouth 2 (Two) Times a Day.    Magdalena Can MD   desvenlafaxine (PRISTIQ) 100 MG 24 hr tablet Take 1 tablet by mouth Every Morning. 2/14/24   Magdalena Can MD   doxepin (SINEquan) 50 MG capsule Take 2 capsules by mouth every night at bedtime. 3/20/24   Magdalena Can MD   lamoTRIgine (LaMICtal) 100 MG tablet Take 1 tablet by mouth Every 12 (Twelve) Hours. 1/29/24   Magdalena Can MD        Social History:   Social History     Tobacco Use    Smoking status: Former     Average packs/day: 0.5 packs/day for 3.0 years (1.5 ttl pk-yrs)     Types: Cigarettes     Start date: 2016     Passive exposure: Past    Smokeless tobacco: Never   Vaping Use    Vaping status: Never Used   Substance Use Topics    Alcohol use: Not Currently    Drug use: Never         Review of Systems:  Review of Systems   Constitutional:  Negative for chills and fever.   HENT:  Negative for congestion, ear pain and sore throat.    Eyes:  Negative for pain.   Respiratory:  Negative for cough, chest tightness and shortness of breath.    Cardiovascular:  Negative for chest pain.   Gastrointestinal:  Negative for abdominal pain, diarrhea, nausea and vomiting.   Genitourinary:  Negative for flank pain and hematuria.   Musculoskeletal:  Negative for joint swelling.   Skin:  Positive for color change. Negative for pallor.   Neurological:  Positive for weakness. Negative for seizures and headaches.   Psychiatric/Behavioral:  Positive for confusion.    All other systems reviewed and are negative.       Physical Exam:  /74   Pulse 104   Temp 98 °F (36.7 °C) (Oral)   Resp 19   Ht 162.6 cm (64\")   Wt 75.8 kg (167 lb 1.7 oz)   LMP  (LMP Unknown)   SpO2 94%   Breastfeeding No   BMI 28.68 kg/m²         Physical Exam  Constitutional:     "   Appearance: Normal appearance.      Comments: somnolent   HENT:      Head: Normocephalic and atraumatic.      Nose: Nose normal.      Mouth/Throat:      Mouth: Mucous membranes are moist.   Eyes:      Extraocular Movements: Extraocular movements intact.      Conjunctiva/sclera: Conjunctivae normal.      Pupils: Pupils are equal, round, and reactive to light.   Cardiovascular:      Rate and Rhythm: Normal rate and regular rhythm.      Pulses: Normal pulses.      Heart sounds: Normal heart sounds.   Pulmonary:      Effort: Pulmonary effort is normal.      Breath sounds: Normal breath sounds.   Abdominal:      General: There is no distension.      Palpations: Abdomen is soft.      Tenderness: There is no abdominal tenderness.   Musculoskeletal:         General: Normal range of motion.      Cervical back: Normal range of motion and neck supple.   Skin:     General: Skin is warm and dry.      Capillary Refill: Capillary refill takes less than 2 seconds.   Neurological:      General: No focal deficit present.      Mental Status: She is alert.   Psychiatric:         Mood and Affect: Mood normal.         Behavior: Behavior normal.                      Procedures:  Procedures      Medical Decision Making:      Comorbidities that affect care:    Depression, anxiety, ADHD, Cystitis    External Notes reviewed:    Previous Clinic Note: Patient seen by her OB/GYN on 4/23/2024 for well woman exam      The following orders were placed and all results were independently analyzed by me:  Orders Placed This Encounter   Procedures    CT Head Without Contrast    [place] Draw    Comprehensive Metabolic Panel    Acetaminophen Level    Ethanol    Salicylate Level    Urine Drug Screen - Urine, Clean Catch    CBC Auto Differential    NPO Diet NPO Type: Strict NPO    Vital Signs    Continuous Pulse Oximetry    Oxygen Therapy- Nasal Cannula; Titrate 1-6 LPM Per SpO2; 90 - 95%    POC Glucose Once    ECG 12 Lead Drug Monitoring    Insert  Peripheral IV    CBC & Differential    Green Top (Gel)    Lavender Top    Gold Top - SST    Light Blue Top       Medications Given in the Emergency Department:  Medications   sodium chloride 0.9 % flush 10 mL (has no administration in time range)        ED Course:    The patient was initially evaluated in the triage area where orders were placed. The patient was later dispositioned by Trey Britton MD.      The patient was advised to stay for completion of workup which includes but is not limited to communication of labs and radiological results, reassessment and plan. The patient was advised that leaving prior to disposition by a provider could result in critical findings that are not communicated to the patient.          Labs:    Lab Results (last 24 hours)       Procedure Component Value Units Date/Time    CBC & Differential [[id number]]  (Abnormal) Collected: 08/12/24 2144    Specimen: Blood Updated: 08/12/24 2152    Narrative:      The following orders were created for panel order CBC & Differential.  Procedure                               Abnormality         Status                     ---------                               -----------         ------                     CBC Auto Differential[[id number]]        Abnormal            Final result                 Please view results for these tests on the individual orders.    Comprehensive Metabolic Panel [[id number]]  (Abnormal) Collected: 08/12/24 2144    Specimen: Blood Updated: 08/12/24 2215     Glucose 166 mg/dL      BUN 8 mg/dL      Creatinine 0.89 mg/dL      Sodium 140 mmol/L      Potassium 3.7 mmol/L      Chloride 103 mmol/L      CO2 24.0 mmol/L      Calcium 9.1 mg/dL      Total Protein 7.6 g/dL      Albumin 4.6 g/dL      ALT (SGPT) 17 U/L      AST (SGOT) 18 U/L      Alkaline Phosphatase 110 U/L      Total Bilirubin 0.2 mg/dL      Globulin 3.0 gm/dL      A/G Ratio 1.5 g/dL      BUN/Creatinine Ratio 9.0     Anion Gap 13.0 mmol/L      eGFR 90.7  mL/min/1.73     Narrative:      GFR Normal >60  Chronic Kidney Disease <60  Kidney Failure <15      Acetaminophen Level [[id number]]  (Normal) Collected: 08/12/24 2144    Specimen: Blood Updated: 08/12/24 2215     Acetaminophen <5.0 mcg/mL     Ethanol [[id number]] Collected: 08/12/24 2144    Specimen: Blood Updated: 08/12/24 2215     Ethanol <10 mg/dL      Ethanol % <0.010 %     Narrative:      Ethanol (Plasma)  <10 Essentially Negative    Toxic Concentrations           mg/dL    Flushing, slowing of reflexes    Impaired visual activity         Depression of CNS              >100  Possible Coma                  >300       Salicylate Level [[id number]]  (Normal) Collected: 08/12/24 2144    Specimen: Blood Updated: 08/12/24 2215     Salicylate 1.8 mg/dL     CBC Auto Differential [[id number]]  (Abnormal) Collected: 08/12/24 2144    Specimen: Blood Updated: 08/12/24 2152     WBC 12.74 10*3/mm3      RBC 4.08 10*6/mm3      Hemoglobin 11.9 g/dL      Hematocrit 36.4 %      MCV 89.2 fL      MCH 29.2 pg      MCHC 32.7 g/dL      RDW 13.0 %      RDW-SD 42.7 fl      MPV 8.5 fL      Platelets 356 10*3/mm3      Neutrophil % 62.9 %      Lymphocyte % 29.6 %      Monocyte % 5.1 %      Eosinophil % 1.2 %      Basophil % 0.5 %      Immature Grans % 0.7 %      Neutrophils, Absolute 8.02 10*3/mm3      Lymphocytes, Absolute 3.77 10*3/mm3      Monocytes, Absolute 0.65 10*3/mm3      Eosinophils, Absolute 0.15 10*3/mm3      Basophils, Absolute 0.06 10*3/mm3      Immature Grans, Absolute 0.09 10*3/mm3      nRBC 0.0 /100 WBC     Urine Drug Screen - Urine, Clean Catch [[id number]]  (Abnormal) Collected: 08/13/24 0048    Specimen: Urine, Clean Catch Updated: 08/13/24 0122     Amphet/Methamphet, Screen Negative     Barbiturates Screen, Urine Negative     Benzodiazepine Screen, Urine Positive     Cocaine Screen, Urine Negative     Opiate Screen Negative     THC, Screen, Urine Negative     Methadone Screen, Urine Negative     Oxycodone  Screen, Urine Negative     Fentanyl, Urine Negative    Narrative:      Negative Thresholds Per Drugs Screened:    Amphetamines                 500 ng/ml  Barbiturates                 200 ng/ml  Benzodiazepines              100 ng/ml  Cocaine                      300 ng/ml  Methadone                    300 ng/ml  Opiates                      300 ng/ml  Oxycodone                    100 ng/ml  THC                           50 ng/ml  Fentanyl                       5 ng/ml      The Normal Value for all drugs tested is negative. This report includes final unconfirmed screening results to be used for medical treatment purposes only. Unconfirmed results must not be used for non-medical purposes such as employment or legal testing. Clinical consideration should be applied to any drug of abuse test, particularly when unconfirmed results are used.                     Imaging:    CT Head Without Contrast    Result Date: 8/13/2024  CT HEAD WO CONTRAST-  Date of exam: 8/13/2024, 1:46 A.M.  Comparison: 4/18/2024.  INDICATIONS: 29-year-old female w/ AMS (altered mental status); dysarthria; h/o recent alprazolam overdose.  TECHNIQUE: Axial CT images were obtained of the head/brain without contrast administration. Reconstructed 2D (two-dimensional) coronal and sagittal images were also obtained. Automated exposure control and iterative construction methods were used.  FINDINGS: A routine nonenhanced head CT was performed. No acute brain abnormality is identified. No acute intracranial hemorrhage. No acute infarct is seen. The gray-white matter differentiation is preserved. No midline shift or acute intracranial mass effect is seen. The ventricular size and configuration are within normal limits. The extra-axial spaces are within normal limits. No acute skull fracture. No significant acute findings are seen with regard to the imaged orbits or middle ear clefts. There is chronic rightward nasoseptal deviation with an associated nasal  spur. Mild age-indeterminate mucosal thickening and/or retained secretions involve(s) the imaged paranasal sinuses. No air-fluid interfaces are seen within the imaged paranasal sinuses.       No acute brain abnormality is seen. Specifically, no acute intracranial hemorrhage, no acute infarct, no significant intracranial mass lesion, and no acute intracranial mass effect are appreciated.    Portions of this note were completed with a voice recognition program.  Electronically Signed By-Ovidio Lacy MD On:8/13/2024 2:16 AM         Differential Diagnosis and Discussion:      Altered Mental Status: Based on the patient's signs and symptoms, differential diagnosis includes but is not limited to meningitis, stroke, sepsis, subarachnoid hemorrhage, intracranial bleeding, encephalitis, and metabolic encephalopathy.    All labs were reviewed and interpreted by me.  CT scan radiology impression was interpreted by me.    MDM  Number of Diagnoses or Management Options  Diagnosis management comments: Patient is afebrile nontoxic-appearing.  Vital signs are stable.  On presentation patient seems somewhat altered.  She is not answering questions.  Patient monitored emergency department for several hours.  On reevaluation she is alert and oriented.  She is able to ambulate.  She denies overdosing on any of her medication.  She denies SI/HI.  She is tolerating p.o. intake.  Patient's fiancé is at bedside and will be taking her home.  They feel comfortable going home.  Recommend close follow-up with her primary care provider.  Discussed return precautions, discharge instructions and answered all their questions.       Amount and/or Complexity of Data Reviewed  Clinical lab tests: reviewed  Tests in the radiology section of CPT®: reviewed  Review and summarize past medical records: yes  Independent visualization of images, tracings, or specimens: yes    Risk of Complications, Morbidity, and/or Mortality  Presenting problems:  moderate  Management options: moderate                 Patient Care Considerations:    SEPSIS was considered but is NOT present in the emergency department as SIRS criteria is not present.      Consultants/Shared Management Plan:    None    Social Determinants of Health:    Patient has presented with family members who are responsible, reliable and will ensure follow up care.      Disposition and Care Coordination:    Discharged: The patient is suitable and stable for discharge with no need for consideration of admission.    I have explained the patient´s condition, diagnoses and treatment plan based on the information available to me at this time. I have answered questions and addressed any concerns. The patient has a good  understanding of the patient´s diagnosis, condition, and treatment plan as can be expected at this point. The vital signs have been stable. The patient´s condition is stable and appropriate for discharge from the emergency department.      The patient will pursue further outpatient evaluation with the primary care physician or other designated or consulting physician as outlined in the discharge instructions. They are agreeable to this plan of care and follow-up instructions have been explained in detail. The patient has received these instructions in written format and has expressed an understanding of the discharge instructions. The patient is aware that any significant change in condition or worsening of symptoms should prompt an immediate return to this or the closest emergency department or call to 911.  I have explained discharge medications and the need for follow up with the patient/caretakers. This was also printed in the discharge instructions. Patient was discharged with the following medications and follow up:      Medication List      No changes were made to your prescriptions during this visit.      Provider, No Known  [facility]  [place] [address]    In 2 days          Final diagnoses:   Altered mental status, unspecified altered mental status type        ED Disposition       ED Disposition   Discharge    Condition   Stable    Comment   --               This medical record created using voice recognition software.             Trey Britton MD  08/13/24 2883

## 2024-08-18 LAB
QT INTERVAL: 342 MS
QTC INTERVAL: 472 MS

## 2024-09-03 ENCOUNTER — TELEPHONE (OUTPATIENT)
Dept: OBSTETRICS AND GYNECOLOGY | Facility: CLINIC | Age: 29
End: 2024-09-03
Payer: COMMERCIAL

## 2024-09-03 NOTE — TELEPHONE ENCOUNTER
Called patient to reschedule no show.  Patient states doing thru dead zone will call back to reschedule

## 2025-03-26 ENCOUNTER — APPOINTMENT (OUTPATIENT)
Dept: GENERAL RADIOLOGY | Facility: HOSPITAL | Age: 30
End: 2025-03-26
Payer: COMMERCIAL

## 2025-03-26 ENCOUNTER — HOSPITAL ENCOUNTER (EMERGENCY)
Facility: HOSPITAL | Age: 30
Discharge: HOME | End: 2025-03-26
Attending: EMERGENCY MEDICINE | Admitting: EMERGENCY MEDICINE
Payer: COMMERCIAL

## 2025-03-26 VITALS
RESPIRATION RATE: 14 BRPM | SYSTOLIC BLOOD PRESSURE: 115 MMHG | HEIGHT: 64 IN | WEIGHT: 177.03 LBS | OXYGEN SATURATION: 100 % | DIASTOLIC BLOOD PRESSURE: 76 MMHG | HEART RATE: 104 BPM | TEMPERATURE: 98.6 F | BODY MASS INDEX: 30.22 KG/M2

## 2025-03-26 DIAGNOSIS — S93.402A SPRAIN OF LEFT ANKLE, UNSPECIFIED LIGAMENT, INITIAL ENCOUNTER: ICD-10-CM

## 2025-03-26 DIAGNOSIS — S80.12XA CONTUSION OF LEFT LOWER EXTREMITY, INITIAL ENCOUNTER: Primary | ICD-10-CM

## 2025-03-26 PROCEDURE — 73590 X-RAY EXAM OF LOWER LEG: CPT

## 2025-03-26 PROCEDURE — 73610 X-RAY EXAM OF ANKLE: CPT

## 2025-03-26 PROCEDURE — 99283 EMERGENCY DEPT VISIT LOW MDM: CPT

## 2025-03-26 RX ORDER — TRAMADOL HYDROCHLORIDE 50 MG/1
50 TABLET ORAL ONCE
Status: COMPLETED | OUTPATIENT
Start: 2025-03-26 | End: 2025-03-26

## 2025-03-26 RX ADMIN — TRAMADOL HYDROCHLORIDE 50 MG: 50 TABLET, COATED ORAL at 04:00

## 2025-03-26 NOTE — DISCHARGE INSTRUCTIONS
Imaging was unremarkable and did not show any acute bony abnormality.    Rest.  Ice.  Elevate.  Wear splint for comfort and support with stabilization as well as use crutches for ambulation until well.    Medication as prescribed for pain.    Follow-up with your PCP

## 2025-03-26 NOTE — Clinical Note
[facility] EMERGENCY ROOM  [address] [place] [address]  Phone: [phone]  Fax: [phone]    Rmoy Alonso was seen and treated in our emergency department on 3/26/2025.  She may return to work on 03/28/2025.         Thank you for choosing [facility].    Yaquelin Sims APRN

## 2025-03-26 NOTE — Clinical Note
[facility] EMERGENCY ROOM  [address] [place] [address]  Phone: [phone]  Fax: [phone]    Romy Alonso was seen and treated in our emergency department on 3/26/2025.  She may return to work on 03/28/2025.         Thank you for choosing [facility].    Yaquelin Sims APRN

## 2025-03-26 NOTE — ED PROVIDER NOTES
Time: 3:08 AM EDT  Date of encounter:  3/26/2025  Independent Historian/Clinical History and Information was obtained by:   Patient    History is limited by: N/A    Chief Complaint: Leg pain      History of Present Illness:  Patient is a 30 y.o. year old female who presents to the emergency department for evaluation of left leg pain and injury.  Patient slipped in the tub hitting her leg on the side of it a week ago pain was tolerable and area bruised but patient continues persistent pain and now more swelling in her ankle than before.  Patient states she is able to walk and bear weight but it is painful and she is limping along.  Been taking OTC meds without relief.      Patient Care Team  Primary Care Provider: Provider, No Known    Past Medical History:     Allergies   Allergen Reactions    Latex Anaphylaxis     Past Medical History:   Diagnosis Date    ADHD     Anxiety     Cystitis, interstitial     Depression     Migraine      Past Surgical History:   Procedure Laterality Date     SECTION  02/10/2015     Family History   Problem Relation Age of Onset    Hypertension Father     Coronary artery disease Father     Drug abuse Father     Suicide Attempts Father     Hypertension Mother     Ovarian cancer Mother         30'S    Drug abuse Mother     Suicide Attempts Mother     Drug abuse Sister     Skin cancer Other     Hypertension Other     Breast cancer Neg Hx     Uterine cancer Neg Hx     Colon cancer Neg Hx     Prostate cancer Neg Hx     Melanoma Neg Hx     Deep vein thrombosis Neg Hx     Pulmonary embolism Neg Hx        Home Medications:  Prior to Admission medications    Medication Sig Start Date End Date Taking? Authorizing Provider   ALPRAZolam (XANAX) 1 MG tablet Take 1 tablet by mouth 2 (Two) Times a Day As Needed for Anxiety.    Magdalena Can MD   ARIPiprazole (ABILIFY) 20 MG tablet  24   ProviderMagdalena MD   benztropine (COGENTIN) 1 MG tablet Take 1 tablet by mouth 2 (Two) Times  "a Day.    Magdalena Can MD   desvenlafaxine (PRISTIQ) 100 MG 24 hr tablet Take 1 tablet by mouth Every Morning. 2/14/24   Magdalena Can MD   doxepin (SINEquan) 50 MG capsule Take 2 capsules by mouth every night at bedtime. 3/20/24   Magdalena Can MD   lamoTRIgine (LaMICtal) 100 MG tablet Take 1 tablet by mouth Every 12 (Twelve) Hours. 1/29/24   Magdalena Can MD        Social History:   Social History     Tobacco Use    Smoking status: Former     Average packs/day: 0.5 packs/day for 3.0 years (1.5 ttl pk-yrs)     Types: Cigarettes     Start date: 2016     Passive exposure: Past    Smokeless tobacco: Never   Vaping Use    Vaping status: Never Used   Substance Use Topics    Alcohol use: Not Currently    Drug use: Never         Review of Systems:  Review of Systems   Respiratory:  Negative for shortness of breath.    Cardiovascular:  Negative for chest pain.   Musculoskeletal:  Positive for arthralgias (Left lower leg and ankle pain), gait problem and joint swelling (Left ankle).   Skin:  Positive for color change (Bruising left lower leg).   Neurological:  Negative for weakness and numbness.   Hematological: Negative.    Psychiatric/Behavioral: Negative.     All other systems reviewed and are negative.       Physical Exam:  /76   Pulse 104   Temp 98.6 °F (37 °C) (Oral)   Resp 14   Ht 162.6 cm (64\")   Wt 80.3 kg (177 lb 0.5 oz)   LMP  (LMP Unknown)   SpO2 100%   BMI 30.39 kg/m²     Physical Exam  Vitals and nursing note reviewed.   Constitutional:       Appearance: Normal appearance.   Cardiovascular:      Pulses: Normal pulses.   Pulmonary:      Effort: Pulmonary effort is normal.   Musculoskeletal:         General: Swelling (Mild left ankle) and tenderness (Left mid and lower shin and left ankle) present.      Cervical back: Normal range of motion.      Left lower leg: Edema (Ankle edema is likely dependent edema off from injury to left shin where gravity has settled the " swelling) present.   Skin:     General: Skin is warm and dry.      Findings: Bruising (Yellowish-brown bruising to left mid tibia) present.   Neurological:      General: No focal deficit present.      Mental Status: She is alert.      Sensory: No sensory deficit.      Motor: No weakness.   Psychiatric:         Mood and Affect: Mood normal.         Behavior: Behavior normal.                    Medical Decision Making:      Comorbidities that affect care:    Depression, anxiety, ADHD    External Notes reviewed:    Previous ED Note: Patient last seen in the emergency department in August of last year for altered mental status      The following orders were placed and all results were independently analyzed by me:  Orders Placed This Encounter   Procedures    [employer] [facility] 13. Crutches (), 08. [employer] Ankle Stirrup (); Left; Left sided injury    XR Tibia Fibula 2 View Left    XR Ankle 3+ View Left       Medications Given in the Emergency Department:  Medications   traMADol (ULTRAM) tablet 50 mg (50 mg Oral Given 3/26/25 0400)        ED Course:    ED Course as of 03/26/25 0429   Wed Mar 26, 2025   0309 XR Tibia Fibula 2 View Left  No acute findings [DS]   0309 XR Ankle 3+ View Left  No acute findings [DS]      ED Course User Index  [DS] Bethany, Yaquelin, TAMMY       Labs:    Lab Results (last 24 hours)       ** No results found for the last 24 hours. **             Imaging:    XR Tibia Fibula 2 View Left  XR Tibia Fibula 2 View Left, XR Ankle 3+ View Left  Result Date: 3/26/2025  XR ANKLE 3+ VW LEFT-, XR TIBIA FIBULA 2 VW LEFT-  (COMBINED REPORT)  Date of exam: 3/26/2025, 1:56 A.M.  Comparison: None available.  INDICATIONS: 30-year-old female who fell about 1 week ago w/ bruising & injury to the left leg; persistent pain.  TECHNIQUE: Three (3) nonweightbearing views of the left ankle were obtained. Four (4) nonweightbearing views of the left tibia and fibula were obtained. A total of seven (7) views were obtained  for interpretation.  FINDINGS:  LEFT ANKLE: Again, three (3) views of the left ankle were obtained. No acute fracture or acute malalignment is identified. No subacute fracture is suggested. If symptoms or clinical concerns persist, consider imaging follow-up.  LEFT TIBIA/FIBULA: Again, four (4) views of the left tibia and fibula were obtained. No acute fracture or acute malalignment is identified. No subacute fracture is suggested. If symptoms or clinical concerns persist, consider imaging follow-up.  OTHER FINDINGS: No subcutaneous emphysema. No retained radiopaque foreign body. No suprapatellar recess effusion is seen involving the left knee. There is no convincing radiographic evidence for osteomyelitis. The visualized joint spaces are preserved.       (COMBINED IMPRESSION) No acute fracture or acute malalignment is identified. Please see above comments for further detail.   Portions of this note were completed with a voice recognition program.  3/26/2025 2:16 AM by Ovidio Lacy MD on Workstation: [employer]          Differential Diagnosis and Discussion:    Extremity Pain: Differential diagnosis includes but is not limited to soft tissue sprain, tendonitis, tendon injury, dislocation, fracture, deep vein thrombosis, arterial insufficiency, osteoarthritis, bursitis, and ligamentous damage.    PROCEDURES:    X-ray were performed in the emergency department and all X-ray impressions were independently interpreted by me.    No orders to display       Procedures    MDM  Number of Diagnoses or Management Options  Contusion of left lower extremity, initial encounter  Sprain of left ankle, unspecified ligament, initial encounter  Diagnosis management comments: The patient's symptoms are consistent with a strain vs. sprain.      A muscle strain, also known as a pulled muscle, is an injury that occurs when a muscle is overstretched or torn, often as a result of fatigue, overuse, or improper use. This can result in pain,  swelling, and a limited range of motion.     A sprain, on the other hand, is an injury to a ligament, which is the tissue that connects bones to each other. Sprains often occur in joints like the ankle or wrist when they are twisted or impacted in a way that stretches or tears the ligaments. Symptoms of a sprain can include pain, swelling, bruising, and a decreased ability to move the joint.     The patient was counseled to use rest, ice, and elevation and follow-up with their PCP or an orthopedic surgeon.  Patient was put in a ankle brace and given crutches for symptomatic relief       Amount and/or Complexity of Data Reviewed  Tests in the radiology section of CPT®: reviewed and ordered  Tests in the medicine section of CPT®: ordered and reviewed    Risk of Complications, Morbidity, and/or Mortality  Presenting problems: low  Diagnostic procedures: low  Management options: low    Patient Progress  Patient progress: stable         Patient Care Considerations:    NARCOTICS: I considered prescribing opiate pain medication as an outpatient, however no acute bony abnormality      Consultants/Shared Management Plan:    None    Social Determinants of Health:    Patient is independent, reliable, and has access to care.       Disposition and Care Coordination:    Discharged: The patient is suitable and stable for discharge with no need for consideration of admission.    I have explained the patient´s condition, diagnoses and treatment plan based on the information available to me at this time. I have answered questions and addressed any concerns. The patient has a good  understanding of the patient´s diagnosis, condition, and treatment plan as can be expected at this point. The vital signs have been stable. The patient´s condition is stable and appropriate for discharge from the emergency department.      The patient will pursue further outpatient evaluation with the primary care physician or other designated or consulting  physician as outlined in the discharge instructions. They are agreeable to this plan of care and follow-up instructions have been explained in detail. The patient has received these instructions in written format and has expressed an understanding of the discharge instructions. The patient is aware that any significant change in condition or worsening of symptoms should prompt an immediate return to this or the closest emergency department or call to 911.  I have explained discharge medications and the need for follow up with the patient/caretakers. This was also printed in the discharge instructions. Patient was discharged with the following medications and follow up:      Medication List        New Prescriptions      diclofenac 50 MG EC tablet  Commonly known as: VOLTAREN  Take 1 tablet by mouth 3 (Three) Times a Day As Needed (pain).               Where to Get Your Medications        These medications were sent to [id number] - [place], [place] - [address] AT [facility] - [phone] [facility] [phone] FX  [address] [place] [address]      Phone: [phone]   diclofenac 50 MG EC tablet      Provider, No Known  [facility]  [place] [address]      As needed       Final diagnoses:   Contusion of left lower extremity, initial encounter   Sprain of left ankle, unspecified ligament, initial encounter        ED Disposition       ED Disposition   Discharge    Condition   Stable    Comment   --               This medical record created using voice recognition software.             Yaquelin Sims, TAMMY  03/26/25 0429

## 2025-03-26 NOTE — Clinical Note
[facility] EMERGENCY ROOM  [address] [place] [address]  Phone: [phone]  Fax: [phone]    Romy Alonso was seen and treated in our emergency department on 3/26/2025.  She may return to work on 03/28/2025.         Thank you for choosing [facility].    Yaquelin Sims APRN

## 2025-06-16 ENCOUNTER — TELEMEDICINE (OUTPATIENT)
Dept: FAMILY MEDICINE CLINIC | Facility: TELEHEALTH | Age: 30
End: 2025-06-16
Payer: COMMERCIAL

## 2025-06-16 DIAGNOSIS — J01.00 ACUTE MAXILLARY SINUSITIS, RECURRENCE NOT SPECIFIED: Primary | ICD-10-CM

## 2025-06-16 DIAGNOSIS — R05.1 ACUTE COUGH: ICD-10-CM

## 2025-06-16 PROCEDURE — 1160F RVW MEDS BY RX/DR IN RCRD: CPT | Performed by: NURSE PRACTITIONER

## 2025-06-16 PROCEDURE — 1159F MED LIST DOCD IN RCRD: CPT | Performed by: NURSE PRACTITIONER

## 2025-06-16 PROCEDURE — 99213 OFFICE O/P EST LOW 20 MIN: CPT | Performed by: NURSE PRACTITIONER

## 2025-06-16 RX ORDER — BROMPHENIRAMINE MALEATE, PSEUDOEPHEDRINE HYDROCHLORIDE, AND DEXTROMETHORPHAN HYDROBROMIDE 2; 30; 10 MG/5ML; MG/5ML; MG/5ML
5 SYRUP ORAL 4 TIMES DAILY PRN
Qty: 118 ML | Refills: 0 | Status: SHIPPED | OUTPATIENT
Start: 2025-06-16 | End: 2025-06-21

## 2025-06-16 NOTE — LETTER
June 16, 2025     Patient: Romy Alonso   YOB: 1995   Date of Visit: 6/16/2025       To Whom it May Concern:    Romy Alonso was seen in my clinic on 6/16/2025. She  may return to work in one day.           Sincerely,          TAMMY Villanueva        CC: No Recipients

## 2025-06-16 NOTE — PATIENT INSTRUCTIONS
Cough, Adult  A cough helps to clear your throat and lungs. It may be a sign of an illness or another condition.  A short-term (acute) cough may last 2-3 weeks. A long-term (chronic) cough may last 8 or more weeks.  Many things can cause a cough. They include:  Illnesses such as:  An infection in your throat or lungs.  Asthma or other heart or lung problems.  Gastroesophageal reflux. This is when acid comes back up from your stomach.  Breathing in things that bother (irritate) your lungs.  Allergies.  Postnasal drip. This is when mucus runs down the back of your throat.  Smoking.  Some medicines.  Follow these instructions at home:  Medicines  Take over-the-counter and prescription medicines only as told by your doctor.  Talk with your doctor before you take cough medicine (cough suppressants).  Eating and drinking  Do not drink alcohol.  Do not drink caffeine.  Drink enough fluid to keep your pee (urine) pale yellow.  Lifestyle  Stay away from cigarette smoke.  Do not smoke or use any products that contain nicotine or tobacco. If you need help quitting, ask your doctor.  Stay away from things that make you cough. These may include perfume, candles, cleaning products, or campfire smoke.  General instructions    Watch for any changes to your cough. Tell your doctor about them.  Always cover your mouth when you cough.  If the air is dry in your home, use a cool mist vaporizer or humidifier.  If your cough is worse at night, try using extra pillows to raise your head up higher while you sleep.  Rest as needed.  Contact a doctor if:  You have new symptoms.  Your symptoms get worse.  You cough up pus.  You have a fever that does not go away.  Your cough does not get better after 2-3 weeks.  Cough medicine does not help, and you are not sleeping well.  You have pain that gets worse or is not helped with medicine.  You are losing weight and do not know why.  You have night sweats.  Get help right away if:  You cough up  blood.  You have trouble breathing.  Your heart is beating very fast.  These symptoms may be an emergency. Get help right away. Call 911.  Do not wait to see if the symptoms will go away.  Do not drive yourself to the hospital.  This information is not intended to replace advice given to you by your health care provider. Make sure you discuss any questions you have with your health care provider.  Document Revised: 08/18/2023 Document Reviewed: 08/18/2023  Elsevier Patient Education © 2024 [employer]

## 2025-06-16 NOTE — PROGRESS NOTES
You have chosen to receive care through a telehealth visit.  Do you consent to use a video/audio connection for your medical care today? Yes     HPI  History of Present Illness  The patient is a 30-year-old female who presents for evaluation of a sinus infection.    She has been experiencing symptoms suggestive of a sinus infection for over 10 days. These include severe headache, facial pressure around the eyes and cheeks, stabbing ear pain, and alternating between nasal congestion and runny nose. She also reports a persistent cough, which she describes as an attempt to expectorate without success. She has been using Mucinex, DayQuil, and NyQuil, but these have not provided significant relief. She was previously seen at an urgent care facility in 04/2025 for an upper respiratory infection, but her current symptoms are primarily facial. She has no known allergies to antibiotics. She has previously taken Augmentin and found Bromfed-DM effective for her cough. She does not require Diflucan when on antibiotics.    She is currently on Abilify, Cogentin, Pristiq, Lamictal, and Vyvanse. She has not taken Vistaril today as it is prescribed on an as-needed basis for anxiety.    Review of Systems   Constitutional: Negative.    HENT:  Positive for congestion, postnasal drip, rhinorrhea, sinus pressure and sore throat.    Respiratory:  Positive for cough. Negative for apnea, choking, chest tightness, shortness of breath and wheezing.    Cardiovascular: Negative.    Gastrointestinal: Negative.    Allergic/Immunologic: Positive for environmental allergies.   Neurological:  Positive for headaches.   Hematological: Negative.    Psychiatric/Behavioral: Negative.         Past Medical History:   Diagnosis Date    ADHD     Anxiety     Cystitis, interstitial     Depression     Migraine        Family History   Problem Relation Age of Onset    Hypertension Father     Coronary artery disease Father     Drug abuse Father     Suicide  Attempts Father     Hypertension Mother     Ovarian cancer Mother         30'S    Drug abuse Mother     Suicide Attempts Mother     Drug abuse Sister     Skin cancer Other     Hypertension Other     Breast cancer Neg Hx     Uterine cancer Neg Hx     Colon cancer Neg Hx     Prostate cancer Neg Hx     Melanoma Neg Hx     Deep vein thrombosis Neg Hx     Pulmonary embolism Neg Hx        Social History     Socioeconomic History    Marital status: Significant Other   Tobacco Use    Smoking status: Former     Average packs/day: 0.5 packs/day for 3.0 years (1.5 ttl pk-yrs)     Types: Cigarettes     Start date: 2016     Passive exposure: Current    Smokeless tobacco: Never   Vaping Use    Vaping status: Every Day    Substances: Nicotine   Substance and Sexual Activity    Alcohol use: Not Currently    Drug use: Never    Sexual activity: Yes     Partners: Male     Birth control/protection: I.U.D.     Comment: Mirena, 2016         There were no vitals taken for this visit.    PHYSICAL EXAM  Physical Exam   Constitutional: She appears well-developed and well-nourished. She does not have a sickly appearance. She does not appear ill. No distress.   HENT:   Head: Normocephalic and atraumatic.   Right Ear: Hearing normal.   Left Ear: Hearing normal.   Nose: Rhinorrhea and congestion present. Right sinus exhibits maxillary sinus tenderness and frontal sinus tenderness. Left sinus exhibits maxillary sinus tenderness and frontal sinus tenderness.   Mouth/Throat: Mouth/Lips are normal.  Pulmonary/Chest: Effort normal.   Neurological: She is alert.   Skin: There is nail bed cyanosis.   Psychiatric: She has a normal mood and affect.   Vitals reviewed.    Physical Exam      Diagnoses and all orders for this visit:    1. Acute maxillary sinusitis, recurrence not specified (Primary)  -     amoxicillin-clavulanate (AUGMENTIN) 875-125 MG per tablet; Take 1 tablet by mouth 2 (Two) Times a Day.  Dispense: 20 tablet; Refill: 0    2. Acute  cough  -     brompheniramine-pseudoephedrine-DM 30-2-10 MG/5ML syrup; Take 5 mL by mouth 4 (Four) Times a Day As Needed for Cough or Congestion for up to 5 days.  Dispense: 118 mL; Refill: 0      Assessment & Plan  1. Sinusitis.  - Symptoms include facial pressure, pain around the eyes and cheeks, stabbing pains in the ears, nasal congestion, and a persistent cough for over 10 days.  - DayQuil and NyQuil have been used without significant relief.  - Augmentin 875 mg twice daily for 7 days has been prescribed to treat the sinus infection.  - Bromfed-DM has been prescribed to manage the cough. Medication sent to pharmacy.      FOLLOW-UP  As discussed during visit with [facility], if symptoms worsen or fail to improve, follow-up with PCP/Urgent Care/Emergency Department.    Patient verbalizes understanding of medications, instructions for treatment and follow-up.    Patient or patient representative verbalized consent for the use of Ambient Listening during the visit with  TAMMY Villanueva for chart documentation. 6/16/2025  13:45 EDT    TAMMY Villanueva  06/16/2025  13:45 EDT    The use of a video visit has been reviewed with the patient and verbal informed consent has been obtained. Myself and Romy Alonso participated in this visit. The patient is located in [place], [place], and I am located in [place], [place]. MyChart and Twillio were utilized.

## 2025-06-24 ENCOUNTER — TELEMEDICINE (OUTPATIENT)
Dept: FAMILY MEDICINE CLINIC | Facility: TELEHEALTH | Age: 30
End: 2025-06-24
Payer: COMMERCIAL

## 2025-06-24 DIAGNOSIS — J40 BRONCHITIS: Primary | ICD-10-CM

## 2025-06-24 PROCEDURE — 1159F MED LIST DOCD IN RCRD: CPT | Performed by: NURSE PRACTITIONER

## 2025-06-24 PROCEDURE — 99213 OFFICE O/P EST LOW 20 MIN: CPT | Performed by: NURSE PRACTITIONER

## 2025-06-24 PROCEDURE — 1160F RVW MEDS BY RX/DR IN RCRD: CPT | Performed by: NURSE PRACTITIONER

## 2025-06-24 RX ORDER — DEXTROMETHORPHAN HYDROBROMIDE AND PROMETHAZINE HYDROCHLORIDE 15; 6.25 MG/5ML; MG/5ML
5 SYRUP ORAL NIGHTLY PRN
Qty: 100 ML | Refills: 0 | Status: SHIPPED | OUTPATIENT
Start: 2025-06-24

## 2025-06-24 RX ORDER — PREDNISONE 10 MG/1
TABLET ORAL
Qty: 30 TABLET | Refills: 0 | Status: SHIPPED | OUTPATIENT
Start: 2025-06-24

## 2025-06-24 NOTE — PROGRESS NOTES
You have chosen to receive care through a telehealth visit.  Do you consent to use a video/audio connection for your medical care today? Yes     CHIEF COMPLAINT  Chief Complaint   Patient presents with    Cough         HPI  Romy Alonso is a 30 y.o. female  presents with complaint of cough.  She states that she is on the last day of her antibiotics and has been taking Bromfed and the cough is not better    Review of Systems   HENT:  Positive for congestion.    Respiratory:  Positive for cough.    All other systems reviewed and are negative.      Past Medical History:   Diagnosis Date    ADHD     Anxiety     Cystitis, interstitial     Depression     Migraine        Family History   Problem Relation Age of Onset    Hypertension Father     Coronary artery disease Father     Drug abuse Father     Suicide Attempts Father     Hypertension Mother     Ovarian cancer Mother         30'S    Drug abuse Mother     Suicide Attempts Mother     Drug abuse Sister     Skin cancer Other     Hypertension Other     Breast cancer Neg Hx     Uterine cancer Neg Hx     Colon cancer Neg Hx     Prostate cancer Neg Hx     Melanoma Neg Hx     Deep vein thrombosis Neg Hx     Pulmonary embolism Neg Hx        Social History     Socioeconomic History    Marital status: Significant Other   Tobacco Use    Smoking status: Former     Average packs/day: 0.5 packs/day for 3.0 years (1.5 ttl pk-yrs)     Types: Cigarettes     Start date: 2016     Passive exposure: Current    Smokeless tobacco: Never   Vaping Use    Vaping status: Every Day    Substances: Nicotine   Substance and Sexual Activity    Alcohol use: Not Currently    Drug use: Never    Sexual activity: Yes     Partners: Male     Birth control/protection: I.U.D.     Comment: Mirena, 2016       Romy Alonso  reports that she has quit smoking. Her smoking use included cigarettes. She started smoking about 9 years ago. She has a 1.5 pack-year smoking history. She has been exposed  to tobacco smoke. She has never used smokeless tobacco.           There were no vitals taken for this visit.    PHYSICAL EXAM  Physical Exam   Constitutional: She appears well-developed and well-nourished.   HENT:   Head: Normocephalic.   Eyes: Pupils are equal, round, and reactive to light.   Pulmonary/Chest: Effort normal.   Musculoskeletal: Normal range of motion.   Neurological: She is alert.   Psychiatric: She has a normal mood and affect.       Results for orders placed or performed during the hospital encounter of 04/16/25   POCT SARS-CoV-2 Antigen    Collection Time: 04/16/25  9:35 AM    Specimen: Nasopharynx; Swab   Result Value Ref Range    SARS Antigen Not Detected Not Detected, Presumptive Negative    Internal Control Passed Passed    Lot Number [id number]     Expiration Date 61,825    POC Influenza A / B    Collection Time: 04/16/25  9:35 AM    Specimen: Swab   Result Value Ref Range    Rapid Influenza A Ag Negative Negative    Rapid Influenza B Ag Negative Negative    Internal Control Passed Passed    Lot Number [id number]     Expiration Date 101,726    POC Rapid Strep A    Collection Time: 04/16/25  9:35 AM    Specimen: Swab   Result Value Ref Range    Rapid Strep A Screen Negative Negative, VALID, INVALID, Not Performed    Internal Control Passed Passed    Lot Number #[id number]     Expiration Date 42,426        Diagnoses and all orders for this visit:    1. Bronchitis (Primary)  -     predniSONE (DELTASONE) 10 MG tablet; 5 po for 2 days, 4 po for 2 days, 3 po for 2 days, 2 po for 2 days, 1 po for 2 days  Dispense: 30 tablet; Refill: 0  -     promethazine-dextromethorphan (PROMETHAZINE-DM) 6.25-15 MG/5ML syrup; Take 5 mL by mouth At Night As Needed for Cough.  Dispense: 100 mL; Refill: 0          FOLLOW-UP  As discussed during visit with PCP/[facility] if no improvement or Urgent Care/Emergency Department if worsening of symptoms    Patient verbalizes understanding of medication dosage, comfort  measures, instructions for treatment and follow-up.    Soco Dutta, APRYUSUF  06/24/2025  06:51 EDT    Mode of Visit: Video  Location of patient: -HOME-  Location of provider: +HOME+  You have chosen to receive care through a telehealth visit.  The patient has signed the video visit consent form.  The visit included audio and video interaction. No technical issues occurred during this visit.      The use of a video visit has been reviewed with the patient and verbal informed consent has been obtained. Myself and Romy Alonso participated in this visit. The patient is located in [address] [place] [address].   I am located in [place], [place]. [employer] and [employer] were utilized. I spent 10 minutes in the patient's chart for this visit.         Note Disclaimer: At [facility], we believe that sharing information builds trust and better   relationships. You are receiving this note because you recently visited [facility]. It is possible you   will see health information before a provider has talked with you about it. This kind of information can   be easy to misunderstand. To help you fully understand what it means for your health, we urge you to   discuss this note with your provider.

## 2025-08-28 ENCOUNTER — TELEMEDICINE (OUTPATIENT)
Dept: FAMILY MEDICINE CLINIC | Facility: TELEHEALTH | Age: 30
End: 2025-08-28
Payer: COMMERCIAL
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
## 2025-08-28 RX ORDER — GABAPENTIN 300 MG/1
1 CAPSULE ORAL EVERY 12 HOURS SCHEDULED
COMMUNITY
Start: 2025-08-27

## 2025-08-28 RX ORDER — CARIPRAZINE 1.5 MG/1
CAPSULE, GELATIN COATED ORAL
COMMUNITY
Start: 2025-08-01

## 2025-08-28 RX ORDER — MUPIROCIN 2 %
1 OINTMENT (GRAM) TOPICAL 3 TIMES DAILY
Qty: 22 G | Refills: 0 | Status: SHIPPED | OUTPATIENT
Start: 2025-08-28 | End: 2025-09-04

## 2025-08-28 RX ORDER — PREDNISONE 20 MG/1
20 TABLET ORAL 2 TIMES DAILY
Qty: 6 TABLET | Refills: 0 | Status: SHIPPED | OUTPATIENT
Start: 2025-08-28 | End: 2025-08-31

## 2025-08-28 RX ORDER — SULFAMETHOXAZOLE AND TRIMETHOPRIM 800; 160 MG/1; MG/1
1 TABLET ORAL 2 TIMES DAILY
Qty: 14 TABLET | Refills: 0 | Status: SHIPPED | OUTPATIENT
Start: 2025-08-28 | End: 2025-09-04

## 2025-08-28 RX ORDER — DEUTETRABENAZINE 6 MG/1
1 TABLET, COATED ORAL 2 TIMES DAILY WITH MEALS
COMMUNITY
Start: 2025-08-21

## 2025-08-28 RX ORDER — DULOXETINE HYDROCHLORIDE 30 MG/1
CAPSULE, DELAYED RELEASE ORAL
COMMUNITY
Start: 2025-08-01